# Patient Record
Sex: MALE | Race: WHITE | NOT HISPANIC OR LATINO | URBAN - METROPOLITAN AREA
[De-identification: names, ages, dates, MRNs, and addresses within clinical notes are randomized per-mention and may not be internally consistent; named-entity substitution may affect disease eponyms.]

---

## 2019-11-14 ENCOUNTER — INPATIENT (INPATIENT)
Facility: HOSPITAL | Age: 49
LOS: 1 days | Discharge: HOME | End: 2019-11-16
Attending: NEUROLOGICAL SURGERY | Admitting: NEUROLOGICAL SURGERY
Payer: COMMERCIAL

## 2019-11-14 VITALS
DIASTOLIC BLOOD PRESSURE: 81 MMHG | SYSTOLIC BLOOD PRESSURE: 142 MMHG | HEART RATE: 87 BPM | TEMPERATURE: 97 F | RESPIRATION RATE: 18 BRPM | OXYGEN SATURATION: 100 %

## 2019-11-14 DIAGNOSIS — M21.371 FOOT DROP, RIGHT FOOT: ICD-10-CM

## 2019-11-14 LAB
ALBUMIN SERPL ELPH-MCNC: 4.7 G/DL — SIGNIFICANT CHANGE UP (ref 3.5–5.2)
ALP SERPL-CCNC: 70 U/L — SIGNIFICANT CHANGE UP (ref 30–115)
ALT FLD-CCNC: 67 U/L — HIGH (ref 0–41)
ANION GAP SERPL CALC-SCNC: 12 MMOL/L — SIGNIFICANT CHANGE UP (ref 7–14)
APTT BLD: 29 SEC — SIGNIFICANT CHANGE UP (ref 27–39.2)
AST SERPL-CCNC: 27 U/L — SIGNIFICANT CHANGE UP (ref 0–41)
BASOPHILS # BLD AUTO: 0.03 K/UL — SIGNIFICANT CHANGE UP (ref 0–0.2)
BASOPHILS NFR BLD AUTO: 0.4 % — SIGNIFICANT CHANGE UP (ref 0–1)
BILIRUB SERPL-MCNC: 0.3 MG/DL — SIGNIFICANT CHANGE UP (ref 0.2–1.2)
BLD GP AB SCN SERPL QL: SIGNIFICANT CHANGE UP
BUN SERPL-MCNC: 23 MG/DL — HIGH (ref 10–20)
CALCIUM SERPL-MCNC: 9.4 MG/DL — SIGNIFICANT CHANGE UP (ref 8.5–10.1)
CHLORIDE SERPL-SCNC: 102 MMOL/L — SIGNIFICANT CHANGE UP (ref 98–110)
CO2 SERPL-SCNC: 28 MMOL/L — SIGNIFICANT CHANGE UP (ref 17–32)
CREAT SERPL-MCNC: 0.9 MG/DL — SIGNIFICANT CHANGE UP (ref 0.7–1.5)
EOSINOPHIL # BLD AUTO: 0.12 K/UL — SIGNIFICANT CHANGE UP (ref 0–0.7)
EOSINOPHIL NFR BLD AUTO: 1.6 % — SIGNIFICANT CHANGE UP (ref 0–8)
GLUCOSE SERPL-MCNC: 83 MG/DL — SIGNIFICANT CHANGE UP (ref 70–99)
HCT VFR BLD CALC: 40.3 % — LOW (ref 42–52)
HGB BLD-MCNC: 13.3 G/DL — LOW (ref 14–18)
IMM GRANULOCYTES NFR BLD AUTO: 0.5 % — HIGH (ref 0.1–0.3)
INR BLD: 0.93 RATIO — SIGNIFICANT CHANGE UP (ref 0.65–1.3)
LYMPHOCYTES # BLD AUTO: 1.54 K/UL — SIGNIFICANT CHANGE UP (ref 1.2–3.4)
LYMPHOCYTES # BLD AUTO: 20.3 % — LOW (ref 20.5–51.1)
MCHC RBC-ENTMCNC: 30.1 PG — SIGNIFICANT CHANGE UP (ref 27–31)
MCHC RBC-ENTMCNC: 33 G/DL — SIGNIFICANT CHANGE UP (ref 32–37)
MCV RBC AUTO: 91.2 FL — SIGNIFICANT CHANGE UP (ref 80–94)
MONOCYTES # BLD AUTO: 0.8 K/UL — HIGH (ref 0.1–0.6)
MONOCYTES NFR BLD AUTO: 10.6 % — HIGH (ref 1.7–9.3)
NEUTROPHILS # BLD AUTO: 5.04 K/UL — SIGNIFICANT CHANGE UP (ref 1.4–6.5)
NEUTROPHILS NFR BLD AUTO: 66.6 % — SIGNIFICANT CHANGE UP (ref 42.2–75.2)
NRBC # BLD: 0 /100 WBCS — SIGNIFICANT CHANGE UP (ref 0–0)
PLATELET # BLD AUTO: 277 K/UL — SIGNIFICANT CHANGE UP (ref 130–400)
POTASSIUM SERPL-MCNC: 4.2 MMOL/L — SIGNIFICANT CHANGE UP (ref 3.5–5)
POTASSIUM SERPL-SCNC: 4.2 MMOL/L — SIGNIFICANT CHANGE UP (ref 3.5–5)
PROT SERPL-MCNC: 7.1 G/DL — SIGNIFICANT CHANGE UP (ref 6–8)
PROTHROM AB SERPL-ACNC: 10.7 SEC — SIGNIFICANT CHANGE UP (ref 9.95–12.87)
RBC # BLD: 4.42 M/UL — LOW (ref 4.7–6.1)
RBC # FLD: 12.2 % — SIGNIFICANT CHANGE UP (ref 11.5–14.5)
SODIUM SERPL-SCNC: 142 MMOL/L — SIGNIFICANT CHANGE UP (ref 135–146)
WBC # BLD: 7.57 K/UL — SIGNIFICANT CHANGE UP (ref 4.8–10.8)
WBC # FLD AUTO: 7.57 K/UL — SIGNIFICANT CHANGE UP (ref 4.8–10.8)

## 2019-11-14 PROCEDURE — 71045 X-RAY EXAM CHEST 1 VIEW: CPT | Mod: 26

## 2019-11-14 PROCEDURE — 93010 ELECTROCARDIOGRAM REPORT: CPT

## 2019-11-14 PROCEDURE — 99285 EMERGENCY DEPT VISIT HI MDM: CPT

## 2019-11-14 RX ORDER — SENNA PLUS 8.6 MG/1
2 TABLET ORAL AT BEDTIME
Refills: 0 | Status: DISCONTINUED | OUTPATIENT
Start: 2019-11-14 | End: 2019-11-15

## 2019-11-14 RX ORDER — DEXAMETHASONE 0.5 MG/5ML
10 ELIXIR ORAL ONCE
Refills: 0 | Status: DISCONTINUED | OUTPATIENT
Start: 2019-11-14 | End: 2019-11-15

## 2019-11-14 RX ORDER — ACETAMINOPHEN 500 MG
650 TABLET ORAL ONCE
Refills: 0 | Status: COMPLETED | OUTPATIENT
Start: 2019-11-14 | End: 2019-11-14

## 2019-11-14 RX ORDER — MORPHINE SULFATE 50 MG/1
2 CAPSULE, EXTENDED RELEASE ORAL EVERY 4 HOURS
Refills: 0 | Status: DISCONTINUED | OUTPATIENT
Start: 2019-11-14 | End: 2019-11-15

## 2019-11-14 RX ORDER — GABAPENTIN 400 MG/1
600 CAPSULE ORAL AT BEDTIME
Refills: 0 | Status: DISCONTINUED | OUTPATIENT
Start: 2019-11-14 | End: 2019-11-15

## 2019-11-14 RX ORDER — ACETAMINOPHEN 500 MG
650 TABLET ORAL EVERY 6 HOURS
Refills: 0 | Status: DISCONTINUED | OUTPATIENT
Start: 2019-11-14 | End: 2019-11-15

## 2019-11-14 RX ORDER — LOSARTAN POTASSIUM 100 MG/1
25 TABLET, FILM COATED ORAL DAILY
Refills: 0 | Status: DISCONTINUED | OUTPATIENT
Start: 2019-11-14 | End: 2019-11-15

## 2019-11-14 RX ORDER — INFLUENZA VIRUS VACCINE 15; 15; 15; 15 UG/.5ML; UG/.5ML; UG/.5ML; UG/.5ML
0.5 SUSPENSION INTRAMUSCULAR ONCE
Refills: 0 | Status: COMPLETED | OUTPATIENT
Start: 2019-11-14 | End: 2019-11-14

## 2019-11-14 RX ORDER — ONDANSETRON 8 MG/1
4 TABLET, FILM COATED ORAL EVERY 6 HOURS
Refills: 0 | Status: DISCONTINUED | OUTPATIENT
Start: 2019-11-14 | End: 2019-11-15

## 2019-11-14 RX ORDER — OXYCODONE AND ACETAMINOPHEN 5; 325 MG/1; MG/1
2 TABLET ORAL EVERY 4 HOURS
Refills: 0 | Status: DISCONTINUED | OUTPATIENT
Start: 2019-11-14 | End: 2019-11-15

## 2019-11-14 RX ORDER — SODIUM CHLORIDE 9 MG/ML
1000 INJECTION, SOLUTION INTRAVENOUS
Refills: 0 | Status: DISCONTINUED | OUTPATIENT
Start: 2019-11-14 | End: 2019-11-15

## 2019-11-14 RX ORDER — DEXAMETHASONE 0.5 MG/5ML
4 ELIXIR ORAL EVERY 6 HOURS
Refills: 0 | Status: DISCONTINUED | OUTPATIENT
Start: 2019-11-14 | End: 2019-11-15

## 2019-11-14 RX ORDER — OXYCODONE AND ACETAMINOPHEN 5; 325 MG/1; MG/1
1 TABLET ORAL EVERY 4 HOURS
Refills: 0 | Status: DISCONTINUED | OUTPATIENT
Start: 2019-11-14 | End: 2019-11-15

## 2019-11-14 RX ORDER — PANTOPRAZOLE SODIUM 20 MG/1
40 TABLET, DELAYED RELEASE ORAL
Refills: 0 | Status: DISCONTINUED | OUTPATIENT
Start: 2019-11-14 | End: 2019-11-15

## 2019-11-14 RX ADMIN — Medication 4 MILLIGRAM(S): at 23:24

## 2019-11-14 RX ADMIN — GABAPENTIN 600 MILLIGRAM(S): 400 CAPSULE ORAL at 21:49

## 2019-11-14 RX ADMIN — SODIUM CHLORIDE 75 MILLILITER(S): 9 INJECTION, SOLUTION INTRAVENOUS at 23:23

## 2019-11-14 RX ADMIN — Medication 650 MILLIGRAM(S): at 21:48

## 2019-11-14 NOTE — H&P ADULT - HISTORY OF PRESENT ILLNESS
49 year old male with hx of R t foot drop for 2 weeks Pt also c/o pain in his Right LE . Denies any bladder or bowel incontinence .pt was seen in the office by Dr Weinstein and she sent him to the ER

## 2019-11-14 NOTE — H&P ADULT - NSHPLABSRESULTS_GEN_ALL_CORE
Activated Partial Thromboplastin Time in AM (11.14.19 @ 17:22)    Activated Partial Thromboplastin Time: 29.0: Heparin Therapeutic Range: 76..97 Sec sec  Prothrombin Time, Plasma: 10.70 sec (11.14.19 @ 17:22)  INR: 0.93: Recommended ranges for therapeutic INR:    2.0-3.0 for most medical and surgical thromboembolic states    2.0-3.0 for atrial fibrillation    2.0-3.0 for bileaflet mechanical valve in aortic position    2.5-3.5 for mechanical heart valves    Chest 2004;126:[id number]  The presence of direct thrombin inhibitors (argatroban, refludan)  may falsely increase results. ratio (11.14.19 @ 17:22)  Complete Blood Count + Automated Diff (11.14.19 @ 17:22)    WBC Count: 7.57 K/uL    RBC Count: 4.42 M/uL    Hemoglobin: 13.3 g/dL    Hematocrit: 40.3 %    Mean Cell Volume: 91.2 fL    Mean Cell Hemoglobin: 30.1 pg    Mean Cell Hemoglobin Conc: 33.0 g/dL    Red Cell Distrib Width: 12.2 %    Platelet Count - Automated: 277 K/uL    Auto Neutrophil #: 5.04 K/uL    Auto Lymphocyte #: 1.54 K/uL    Auto Monocyte #: 0.80 K/uL    Auto Eosinophil #: 0.12 K/uL    Auto Basophil #: 0.03 K/uL    Auto Neutrophil %: 66.6: Differential percentages must be correlated with absolute numbers for  clinical significance. %    Auto Lymphocyte %: 20.3 %    Auto Monocyte %: 10.6 %    Auto Eosinophil %: 1.6 %    Auto Basophil %: 0.4 %    Auto Immature Granulocyte %: 0.5 %    Nucleated RBC: 0 /100 WBCs  Comprehensive Metabolic Panel (11.14.19 @ 17:22)    Sodium, Serum: 142 mmol/L    Potassium, Serum: 4.2 mmol/L    Chloride, Serum: 102 mmol/L    Carbon Dioxide, Serum: 28 mmol/L    Anion Gap, Serum: 12 mmol/L    Blood Urea Nitrogen, Serum: 23 mg/dL    Creatinine, Serum: 0.9 mg/dL    Glucose, Serum: 83 mg/dL    Calcium, Total Serum: 9.4 mg/dL    Protein Total, Serum: 7.1 g/dL    Albumin, Serum: 4.7 g/dL    Bilirubin Total, Serum: 0.3 mg/dL    Alkaline Phosphatase, Serum: 70 U/L    Aspartate Aminotransferase (AST/SGOT): 27 U/L    Alanine Aminotransferase (ALT/SGPT): 67 U/L    eGFR if Non : 99: Interpretative comment  The units for eGFR are mL/min/1.73M2 (normalized body surface area). The  eGFR is calculated from a serum creatinine using the CKD-EPI equation.  Other variables required for calculation are race, age and sex. Among  patients with chronic kidney disease (CKD), the eGFR is useful in  determining the stage of disease according to KDOQI CKD classification.  All eGFR results are reported numerically with the following  interpretation.          GFR                    With                 Without     (ml/min/1.73 m2)    Kidney Damage       Kidney Damage        >= 90                    Stage 1                     Normal        60-89                    Stage 2                     Decreased GFR        30-59     Stage 3                     Stage 3        15-29                    Stage 4                     Stage 4        < 15                      Stage 5                     Stage 5  Each stage of CKD assumes that the associated GFR level has been in  effect for at least 3 months. Determination of stages one and two (with  eGFR > 59 ml/min/m2) requires estimation of kidney damage for at least 3  months as defined by structural or functional abnormalities.  Limitations: All estimates of GFR will be less accurate for patients at  extremes of muscle mass (including but not limited to frail elderly,  critically ill, or cancer patients), those with unusual diets, and those  with conditions associated with reduced secretion or extrarenal  elimination of creatinine. The eGFR equation is not recommended for use  in patients with unstable creatinine levels. mL/min/1.73M2    eGFR if African American: 115 mL/min/1.73M2

## 2019-11-14 NOTE — ED PROVIDER NOTE - PHYSICAL EXAMINATION
Vital Signs: I have reviewed the initial vital signs.  Constitutional: NAD, well-nourished, appears stated age, no acute distress.  HEENT: Airway patent, moist MM, no erythema/swelling/deformity of oral structures. EOMI, PERRLA.  CV: regular rate, regular rhythm, well-perfused extremities, 2+ b/l DP and radial pulses equal.  Lungs: BCTA, no increased WOB.  ABD: NTND, no guarding or rebound, no pulsatile mass, no hernias.   MSK: Neck supple, nontender, nl ROM, no stepoff. Chest nontender. Back nontender in TLS spine or to b/l bony structures or flanks. +foot drop  INTEG: Skin warm, dry, no rash.  NEURO: A&Ox3, moving all extremities, normal speech  PSYCH: Calm, cooperative, normal affect and interaction.

## 2019-11-14 NOTE — ED PROVIDER NOTE - ATTENDING CONTRIBUTION TO CARE
49yoM with h/o HTN, GERD, presents with R buttock pain radiating down leg and R foot drop x 2 wks, sent in by Dr. Weinsteni in the office today due to MRI resulted today that showed L4-5 disc herniation. Has been on a 5-day steroid course with significant improvement, now reporting only mild achiness and foot drop. Denies urinary or fecal incontinence or retention, fever, abdominal pain, vomiting, urinary symptoms. On exam, afebrile, hemodynamically stable, saturating well, NAD, well appearing, head NCAT, EOMI grossly, anicteric, MMM, no JVD, RRR, nml S1/S2, no m/r/g, lungs CTAB, no w/r/r, abd soft, NT, ND, nml BS, no rebound or guarding, AAO, CN's 3-12 grossly intact, TRUJILLO spontaneously, no leg cyanosis or edema, skin warm, well perfused, no rashes or hives. Difficulty with R foot dorsiflexion, sensation intact/symmetric. No CTL spine TTP/stepoff/deformity. No abdominal s/s's. No signs of acute cord compression. Pt well appearing, pain controlled. Admitted to NSGY per their request, they will f/u the labs/studies sent.

## 2019-11-14 NOTE — ED PROVIDER NOTE - OBJECTIVE STATEMENT
49yM with PMH gerd and htn p/w acute foot drop X 2 wk, referred in by Dr Weinstein for admission to neurosurgery in context of L4-5 disc herniation. pt states he had foot drop issues X 2 weeks with severe pain for past week. he is s/p 5 day course of steroids completed on saturday. no fever chills. +lower back pain and extremity pain. no sensory loss.

## 2019-11-14 NOTE — ED ADULT NURSE NOTE - OBJECTIVE STATEMENT
" I jhad severe pain on right hip down my leg and aches" states has tingling in right leg. " pain comes and goes but tingling is always there. came from pmd office and was told to come here due to right foot drop " dr lowery" denies n/v/f/d. denies sob. denies hematuria denies issue going to bathroom

## 2019-11-14 NOTE — H&P ADULT - NSHPREVIEWOFSYSTEMS_GEN_ALL_CORE
· Review of Systems: Eyes:  No visual changes, eye pain or discharge.  	ENMT:  No hearing changes, pain, no sore throat or runny nose, no difficulty swallowing  	Cardiac:  No chest pain, SOB or edema. No chest pain with exertion.  	Respiratory:  No cough or respiratory distress.    	GI:  No nausea, vomiting, diarrhea or abdominal pain.  	:  No dysuria, frequency or burning.  	MS:  No myalgia, +weakness, no joint pain . +back pain.  	Neuro:  No headache.  No LOC.  	Skin:  No skin rash.   Endocrine: No history of thyroid disease or diabetes.

## 2019-11-14 NOTE — H&P ADULT - NSHPPHYSICALEXAM_GEN_ALL_CORE
General well nourished well developed   Head NC AT   CHest CTA ,   Heart S1S2 nl   Abd + BS  NT/ ND  SOft   Ext no C/C/E   Neuro A&OX3, PERRL              MAEX4              MS LLE 5/5              MS RLE 5/5 proximal 5/5 knee 5/5 PF 3/5 DF

## 2019-11-14 NOTE — ED PROVIDER NOTE - NS ED ROS FT
Eyes:  No visual changes, eye pain or discharge.  ENMT:  No hearing changes, pain, no sore throat or runny nose, no difficulty swallowing  Cardiac:  No chest pain, SOB or edema. No chest pain with exertion.  Respiratory:  No cough or respiratory distress.    GI:  No nausea, vomiting, diarrhea or abdominal pain.  :  No dysuria, frequency or burning.  MS:  No myalgia, +weakness, no joint pain . +back pain.  Neuro:  No headache.  No LOC.  Skin:  No skin rash.   Endocrine: No history of thyroid disease or diabetes.

## 2019-11-14 NOTE — ED ADULT TRIAGE NOTE - CHIEF COMPLAINT QUOTE
sent in by Marina SHOEMAKER (Neuro) for admission, surgery to repair L4-L5 disc herniation, acute foot drop

## 2019-11-14 NOTE — ED PROVIDER NOTE - CLINICAL SUMMARY MEDICAL DECISION MAKING FREE TEXT BOX
49yoM with h/o HTN, GERD, presents with R buttock pain radiating down leg and R foot drop x 2 wks, sent in by Dr. Weinstein in the office today due to MRI resulted today that showed L4-5 disc herniation. Has been on a 5-day steroid course with significant improvement, now reporting only mild achiness and foot drop. Denies urinary or fecal incontinence or retention, fever, abdominal pain, vomiting, urinary symptoms. On exam, afebrile, hemodynamically stable, saturating well, NAD, well appearing, head NCAT, EOMI grossly, anicteric, MMM, no JVD, RRR, nml S1/S2, no m/r/g, lungs CTAB, no w/r/r, abd soft, NT, ND, nml BS, no rebound or guarding, AAO, CN's 3-12 grossly intact, TRUJILLO spontaneously, no leg cyanosis or edema, skin warm, well perfused, no rashes or hives. Difficulty with R foot dorsiflexion, sensation intact/symmetric. No CTL spine TTP/stepoff/deformity. No abdominal s/s's. No signs of acute cord compression. Pt well appearing, pain controlled. Admitted to NSGY per their request, they will f/u the labs/studies sent.

## 2019-11-15 ENCOUNTER — RESULT REVIEW (OUTPATIENT)
Age: 49
End: 2019-11-15

## 2019-11-15 PROCEDURE — 88311 DECALCIFY TISSUE: CPT | Mod: 26

## 2019-11-15 PROCEDURE — 88304 TISSUE EXAM BY PATHOLOGIST: CPT | Mod: 26

## 2019-11-15 RX ORDER — MORPHINE SULFATE 50 MG/1
2 CAPSULE, EXTENDED RELEASE ORAL EVERY 4 HOURS
Refills: 0 | Status: DISCONTINUED | OUTPATIENT
Start: 2019-11-15 | End: 2019-11-16

## 2019-11-15 RX ORDER — MEPERIDINE HYDROCHLORIDE 50 MG/ML
12.5 INJECTION INTRAMUSCULAR; INTRAVENOUS; SUBCUTANEOUS
Refills: 0 | Status: DISCONTINUED | OUTPATIENT
Start: 2019-11-15 | End: 2019-11-16

## 2019-11-15 RX ORDER — ACETAMINOPHEN 500 MG
650 TABLET ORAL EVERY 6 HOURS
Refills: 0 | Status: DISCONTINUED | OUTPATIENT
Start: 2019-11-15 | End: 2019-11-16

## 2019-11-15 RX ORDER — HEPARIN SODIUM 5000 [USP'U]/ML
5000 INJECTION INTRAVENOUS; SUBCUTANEOUS EVERY 8 HOURS
Refills: 0 | Status: DISCONTINUED | OUTPATIENT
Start: 2019-11-16 | End: 2019-11-16

## 2019-11-15 RX ORDER — CEFAZOLIN SODIUM 1 G
1000 VIAL (EA) INJECTION EVERY 8 HOURS
Refills: 0 | Status: COMPLETED | OUTPATIENT
Start: 2019-11-15 | End: 2019-11-16

## 2019-11-15 RX ORDER — PANTOPRAZOLE SODIUM 20 MG/1
40 TABLET, DELAYED RELEASE ORAL
Refills: 0 | Status: DISCONTINUED | OUTPATIENT
Start: 2019-11-15 | End: 2019-11-16

## 2019-11-15 RX ORDER — ONDANSETRON 8 MG/1
4 TABLET, FILM COATED ORAL ONCE
Refills: 0 | Status: DISCONTINUED | OUTPATIENT
Start: 2019-11-15 | End: 2019-11-16

## 2019-11-15 RX ORDER — DEXAMETHASONE 0.5 MG/5ML
2 ELIXIR ORAL EVERY 8 HOURS
Refills: 0 | Status: COMPLETED | OUTPATIENT
Start: 2019-11-15 | End: 2019-11-16

## 2019-11-15 RX ORDER — SODIUM CHLORIDE 9 MG/ML
1000 INJECTION, SOLUTION INTRAVENOUS
Refills: 0 | Status: DISCONTINUED | OUTPATIENT
Start: 2019-11-15 | End: 2019-11-16

## 2019-11-15 RX ORDER — ONDANSETRON 8 MG/1
4 TABLET, FILM COATED ORAL EVERY 6 HOURS
Refills: 0 | Status: DISCONTINUED | OUTPATIENT
Start: 2019-11-15 | End: 2019-11-16

## 2019-11-15 RX ORDER — OXYCODONE AND ACETAMINOPHEN 5; 325 MG/1; MG/1
1 TABLET ORAL EVERY 4 HOURS
Refills: 0 | Status: DISCONTINUED | OUTPATIENT
Start: 2019-11-15 | End: 2019-11-16

## 2019-11-15 RX ORDER — HYDROMORPHONE HYDROCHLORIDE 2 MG/ML
0.5 INJECTION INTRAMUSCULAR; INTRAVENOUS; SUBCUTANEOUS
Refills: 0 | Status: DISCONTINUED | OUTPATIENT
Start: 2019-11-15 | End: 2019-11-16

## 2019-11-15 RX ORDER — OXYCODONE AND ACETAMINOPHEN 5; 325 MG/1; MG/1
2 TABLET ORAL EVERY 4 HOURS
Refills: 0 | Status: DISCONTINUED | OUTPATIENT
Start: 2019-11-15 | End: 2019-11-16

## 2019-11-15 RX ORDER — GABAPENTIN 400 MG/1
600 CAPSULE ORAL AT BEDTIME
Refills: 0 | Status: DISCONTINUED | OUTPATIENT
Start: 2019-11-15 | End: 2019-11-16

## 2019-11-15 RX ORDER — HYDROMORPHONE HYDROCHLORIDE 2 MG/ML
1 INJECTION INTRAMUSCULAR; INTRAVENOUS; SUBCUTANEOUS
Refills: 0 | Status: DISCONTINUED | OUTPATIENT
Start: 2019-11-15 | End: 2019-11-16

## 2019-11-15 RX ORDER — SENNA PLUS 8.6 MG/1
2 TABLET ORAL AT BEDTIME
Refills: 0 | Status: DISCONTINUED | OUTPATIENT
Start: 2019-11-15 | End: 2019-11-16

## 2019-11-15 RX ORDER — LOSARTAN POTASSIUM 100 MG/1
25 TABLET, FILM COATED ORAL DAILY
Refills: 0 | Status: DISCONTINUED | OUTPATIENT
Start: 2019-11-15 | End: 2019-11-16

## 2019-11-15 RX ADMIN — HYDROMORPHONE HYDROCHLORIDE 0.5 MILLIGRAM(S): 2 INJECTION INTRAMUSCULAR; INTRAVENOUS; SUBCUTANEOUS at 17:43

## 2019-11-15 RX ADMIN — MORPHINE SULFATE 2 MILLIGRAM(S): 50 CAPSULE, EXTENDED RELEASE ORAL at 23:34

## 2019-11-15 RX ADMIN — Medication 100 MILLIGRAM(S): at 23:32

## 2019-11-15 RX ADMIN — HYDROMORPHONE HYDROCHLORIDE 0.5 MILLIGRAM(S): 2 INJECTION INTRAMUSCULAR; INTRAVENOUS; SUBCUTANEOUS at 18:03

## 2019-11-15 RX ADMIN — LOSARTAN POTASSIUM 25 MILLIGRAM(S): 100 TABLET, FILM COATED ORAL at 05:55

## 2019-11-15 RX ADMIN — GABAPENTIN 600 MILLIGRAM(S): 400 CAPSULE ORAL at 22:12

## 2019-11-15 RX ADMIN — SODIUM CHLORIDE 75 MILLILITER(S): 9 INJECTION, SOLUTION INTRAVENOUS at 18:31

## 2019-11-15 RX ADMIN — HYDROMORPHONE HYDROCHLORIDE 0.5 MILLIGRAM(S): 2 INJECTION INTRAMUSCULAR; INTRAVENOUS; SUBCUTANEOUS at 18:17

## 2019-11-15 RX ADMIN — Medication 2 MILLIGRAM(S): at 22:12

## 2019-11-15 RX ADMIN — HYDROMORPHONE HYDROCHLORIDE 0.5 MILLIGRAM(S): 2 INJECTION INTRAMUSCULAR; INTRAVENOUS; SUBCUTANEOUS at 18:02

## 2019-11-15 RX ADMIN — SODIUM CHLORIDE 100 MILLILITER(S): 9 INJECTION, SOLUTION INTRAVENOUS at 17:32

## 2019-11-15 RX ADMIN — Medication 4 MILLIGRAM(S): at 05:55

## 2019-11-15 NOTE — PROGRESS NOTE ADULT - SUBJECTIVE AND OBJECTIVE BOX
EFRAÍN BRADFORD  MRN-[id number]    L4-5 microdiscectomy     Current Issues:    doing well.  some back pain.  no radicular pain.  states his right foot  has improved on his movements.  no sensory changes     HPI:  49 year old male with hx of R t foot drop for 2 weeks Pt also c/o pain in his Right LE . Denies any bladder or bowel incontinence .pt was seen in the office by Dr Weinstein and she sent him to the ER (14 Nov 2019 18:38)      Allergies    No Known Allergies    Intolerances      MEDICATIONS  (STANDING):  ceFAZolin   IVPB 1000 milliGRAM(s) IV Intermittent every 8 hours  dextrose 5% + sodium chloride 0.45%. 1000 milliLiter(s) (75 mL/Hr) IV Continuous <Continuous>  gabapentin 600 milliGRAM(s) Oral at bedtime  influenza   Vaccine 0.5 milliLiter(s) IntraMuscular once  lactated ringers. 1000 milliLiter(s) (100 mL/Hr) IV Continuous <Continuous>  losartan 25 milliGRAM(s) Oral daily  multivitamin 1 Tablet(s) Oral daily  pantoprazole    Tablet 40 milliGRAM(s) Oral before breakfast    MEDICATIONS  (PRN):  acetaminophen   Tablet .. 650 milliGRAM(s) Oral every 6 hours PRN Temp greater or equal to 38C (100.4F)  HYDROmorphone  Injectable 0.5 milliGRAM(s) IV Push every 10 minutes PRN Moderate Pain (4 - 6)  HYDROmorphone  Injectable 1 milliGRAM(s) IV Push every 10 minutes PRN Severe Pain (7 - 10)  meperidine     Injectable 12.5 milliGRAM(s) IV Push every 10 minutes PRN Shivering  morphine  - Injectable 2 milliGRAM(s) IV Push every 4 hours PRN Severe Pain (7 - 10)  ondansetron Injectable 4 milliGRAM(s) IV Push every 6 hours PRN Nausea and/or Vomiting  ondansetron Injectable 4 milliGRAM(s) IV Push once PRN Nausea and/or Vomiting  oxycodone    5 mG/acetaminophen 325 mG 2 Tablet(s) Oral every 4 hours PRN Moderate Pain (4 - 6)  oxycodone    5 mG/acetaminophen 325 mG 1 Tablet(s) Oral every 4 hours PRN Mild Pain (1 - 3)  senna 2 Tablet(s) Oral at bedtime PRN Constipation    Vital Signs Last 24 Hrs  T(C): 36.5 (15 Nov 2019 05:43), Max: 36.6 (14 Nov 2019 19:17)  T(F): 97.7 (15 Nov 2019 05:43), Max: 97.9 (14 Nov 2019 19:17)  HR: 82 (15 Nov 2019 05:43) (82 - 100)  BP: 124/71 (15 Nov 2019 05:43) (124/71 - 142/77)  BP(mean): --  RR: 18 (15 Nov 2019 05:43) (18 - 18)  SpO2: --    I&O's Detail    11-14    142  |  102  |  23<H>  ----------------------------<  83  4.2   |  28  |  0.9    Ca    9.4      14 Nov 2019 17:22    TPro  7.1  /  Alb  4.7  /  TBili  0.3  /  DBili  x   /  AST  27  /  ALT  67<H>  /  AlkPhos  70  11-14                          13.3   7.57  )-----------( 277      ( 14 Nov 2019 17:22 )             40.3     Exam:    back dry  LE  hip flex/ leg ext/ DF PF EHL on left 5/5       hip flex/ leg ext/ DF PF EHL on left 5/5  sensory intact bilat    Plan:  decadron 2q8 x 24 hrs  pain management  OOB PT rehab  dc tomorrow when cleared oob , ? AFO if needed to right foot after PT eval.                               Home Medications:  Allegra 30 mg oral tablet:  (14 Nov 2019 18:43)  Benicar 20 mg oral tablet: 1 tab(s) orally once a day (14 Nov 2019 18:42)  NexIUM 24HR 20 mg oral delayed release tablet: 1 tab(s) orally once a day (in the morning) (14 Nov 2019 18:43)     PAST MEDICAL & SURGICAL HISTORY:  Acid reflux  HTN (hypertension)

## 2019-11-15 NOTE — BRIEF OPERATIVE NOTE - NSICDXBRIEFPOSTOP_GEN_ALL_CORE_FT
POST-OP DIAGNOSIS:  Lumbar disc herniation with radiculopathy 15-Nov-2019 17:48:37  Gabrielle Weinstein

## 2019-11-15 NOTE — BRIEF OPERATIVE NOTE - OPERATION/FINDINGS
right sided lateral disc herniation posterior to vertebral body of L4.  Sequestered fragment was removed.

## 2019-11-16 ENCOUNTER — TRANSCRIPTION ENCOUNTER (OUTPATIENT)
Age: 49
End: 2019-11-16

## 2019-11-16 VITALS
SYSTOLIC BLOOD PRESSURE: 139 MMHG | HEART RATE: 102 BPM | TEMPERATURE: 98 F | DIASTOLIC BLOOD PRESSURE: 75 MMHG | RESPIRATION RATE: 18 BRPM

## 2019-11-16 RX ORDER — GABAPENTIN 400 MG/1
1 CAPSULE ORAL
Qty: 0 | Refills: 0 | DISCHARGE
Start: 2019-11-16

## 2019-11-16 RX ORDER — METHOCARBAMOL 500 MG/1
1 TABLET, FILM COATED ORAL
Qty: 30 | Refills: 0
Start: 2019-11-16

## 2019-11-16 RX ORDER — SENNA PLUS 8.6 MG/1
2 TABLET ORAL
Qty: 0 | Refills: 0 | DISCHARGE
Start: 2019-11-16

## 2019-11-16 RX ADMIN — HEPARIN SODIUM 5000 UNIT(S): 5000 INJECTION INTRAVENOUS; SUBCUTANEOUS at 13:39

## 2019-11-16 RX ADMIN — Medication 2 MILLIGRAM(S): at 13:38

## 2019-11-16 RX ADMIN — LOSARTAN POTASSIUM 25 MILLIGRAM(S): 100 TABLET, FILM COATED ORAL at 05:38

## 2019-11-16 RX ADMIN — Medication 100 MILLIGRAM(S): at 13:43

## 2019-11-16 RX ADMIN — Medication 1 TABLET(S): at 11:29

## 2019-11-16 RX ADMIN — PANTOPRAZOLE SODIUM 40 MILLIGRAM(S): 20 TABLET, DELAYED RELEASE ORAL at 06:01

## 2019-11-16 RX ADMIN — Medication 100 MILLIGRAM(S): at 05:37

## 2019-11-16 RX ADMIN — SODIUM CHLORIDE 75 MILLILITER(S): 9 INJECTION, SOLUTION INTRAVENOUS at 05:37

## 2019-11-16 RX ADMIN — Medication 2 MILLIGRAM(S): at 05:38

## 2019-11-16 RX ADMIN — ONDANSETRON 4 MILLIGRAM(S): 8 TABLET, FILM COATED ORAL at 00:19

## 2019-11-16 RX ADMIN — MORPHINE SULFATE 2 MILLIGRAM(S): 50 CAPSULE, EXTENDED RELEASE ORAL at 00:35

## 2019-11-16 RX ADMIN — OXYCODONE AND ACETAMINOPHEN 1 TABLET(S): 5; 325 TABLET ORAL at 08:34

## 2019-11-16 RX ADMIN — HEPARIN SODIUM 5000 UNIT(S): 5000 INJECTION INTRAVENOUS; SUBCUTANEOUS at 05:38

## 2019-11-16 NOTE — DISCHARGE NOTE PROVIDER - NSDCCPCAREPLAN_GEN_ALL_CORE_FT
PRINCIPAL DISCHARGE DIAGNOSIS  Diagnosis: Herniated lumbar intervertebral disc  Assessment and Plan of Treatment:       SECONDARY DISCHARGE DIAGNOSES  Diagnosis: Right foot drop  Assessment and Plan of Treatment: Right foot drop    Diagnosis: Herniated lumbar intervertebral disc  Assessment and Plan of Treatment:

## 2019-11-16 NOTE — DISCHARGE NOTE NURSING/CASE MANAGEMENT/SOCIAL WORK - PATIENT PORTAL LINK FT
You can access the FollowMyHealth Patient Portal offered by [facility] by registering at the following website: [email or web address]. By joining [employer]’s FollowMyHealth portal, you will also be able to view your health information using other applications (apps) compatible with our system.

## 2019-11-16 NOTE — DISCHARGE NOTE PROVIDER - CARE PROVIDER_API CALL
Gabrielle Weinstein (MD)  Surgery  [facility] [place] [address]  Phone: [phone]  Fax: [phone]  Follow Up Time:

## 2019-11-16 NOTE — DISCHARGE NOTE PROVIDER - HOSPITAL COURSE
49 year old male right foot drop x 2 weeks and right lower extremity pain/numbness. Sent to ER for eval on 11.14.19. Pt with right L4-5 Disk herniation. Pt taken to the Operating room on 11.15.19 for a Right L4-5 MicroLumbar Diskectomy. Pt did well postop with improvement in his pre-op right leg symptoms and his foot drop. He ambulated with physical therapy and did well. He was discharged home on 11.16.19

## 2019-11-16 NOTE — PROGRESS NOTE ADULT - SUBJECTIVE AND OBJECTIVE BOX
POD # 1    S/P Right L4-5 MLD    Pt seen and examined at bedside. Pt c/o incisional pain at this time. Sts significant improvement in his pre op right lower extremity symptoms including his right foot weakness. Pt has been up ambulating independently and with PT.    Vital Signs Last 24 Hrs  T(C): 36.1 (16 Nov 2019 04:56), Max: 36.9 (15 Nov 2019 17:32)  T(F): 96.9 (16 Nov 2019 04:56), Max: 98.4 (15 Nov 2019 17:32)  HR: 89 (16 Nov 2019 04:56) (89 - 114)  BP: 138/72 (16 Nov 2019 04:56) (131/73 - 166/74)  BP(mean): --  RR: 18 (16 Nov 2019 04:56) (11 - 19)  SpO2: 94% (15 Nov 2019 22:06) (92% - 97%)    PHYSICAL EXAM:  Strength LLE 5/5  RLE DF 4/5 PF 5/5 prox 5/5  Sensation decreased right foot compared to left, equal proximally  KJ R1+, L 2+  Dressing slight bloody d/c    MEDICATIONS:  Antibiotics:  ceFAZolin   IVPB 1000 milliGRAM(s) IV Intermittent every 8 hours    Neuro:  acetaminophen   Tablet .. 650 milliGRAM(s) Oral every 6 hours PRN  gabapentin 600 milliGRAM(s) Oral at bedtime  HYDROmorphone  Injectable 0.5 milliGRAM(s) IV Push every 10 minutes PRN  HYDROmorphone  Injectable 1 milliGRAM(s) IV Push every 10 minutes PRN  meperidine     Injectable 12.5 milliGRAM(s) IV Push every 10 minutes PRN  morphine  - Injectable 2 milliGRAM(s) IV Push every 4 hours PRN  ondansetron Injectable 4 milliGRAM(s) IV Push every 6 hours PRN  ondansetron Injectable 4 milliGRAM(s) IV Push once PRN  oxycodone    5 mG/acetaminophen 325 mG 2 Tablet(s) Oral every 4 hours PRN  oxycodone    5 mG/acetaminophen 325 mG 1 Tablet(s) Oral every 4 hours PRN    Anticoagulation:  heparin  Injectable 5000 Unit(s) SubCutaneous every 8 hours    OTHER:  dexAMETHasone  Injectable 2 milliGRAM(s) IV Push every 8 hours  influenza   Vaccine 0.5 milliLiter(s) IntraMuscular once  losartan 25 milliGRAM(s) Oral daily  pantoprazole    Tablet 40 milliGRAM(s) Oral before breakfast  senna 2 Tablet(s) Oral at bedtime PRN    IVF:  dextrose 5% + sodium chloride 0.45%. 1000 milliLiter(s) IV Continuous <Continuous>  lactated ringers. 1000 milliLiter(s) IV Continuous <Continuous>  multivitamin 1 Tablet(s) Oral daily    LABS:                        13.3   7.57  )-----------( 277      ( 14 Nov 2019 17:22 )             40.3     11-14    142  |  102  |  23<H>  ----------------------------<  83  4.2   |  28  |  0.9    Ca    9.4      14 Nov 2019 17:22    TPro  7.1  /  Alb  4.7  /  TBili  0.3  /  DBili  x   /  AST  27  /  ALT  67<H>  /  AlkPhos  70  11-14    PT/INR - ( 14 Nov 2019 17:22 )   PT: 10.70 sec;   INR: 0.93 ratio    PTT - ( 14 Nov 2019 17:22 )  PTT:29.0 sec    Assessment:  As above    Plan:  Pain Meds PRN  D/C Home

## 2019-11-16 NOTE — DISCHARGE NOTE PROVIDER - NSDCFUADDINST_GEN_ALL_CORE_FT
Keep incision clean. May shower with a dressing today. May remove dressing tomorrow and get incision wet. No baths. Cover for comfort

## 2019-11-16 NOTE — DISCHARGE NOTE PROVIDER - NSDCACTIVITY_GEN_ALL_CORE
No heavy lifting/straining/Stairs allowed/Do not drive or operate machinery/Showering allowed/Do not make important decisions

## 2019-11-16 NOTE — DISCHARGE NOTE PROVIDER - NSDCMRMEDTOKEN_GEN_ALL_CORE_FT
Allegra 30 mg oral tablet:   Benicar 20 mg oral tablet: 1 tab(s) orally once a day  gabapentin 600 mg oral tablet: 1 tab(s) orally once a day (at bedtime)  NexIUM 24HR 20 mg oral delayed release tablet: 1 tab(s) orally once a day (in the morning)  oxycodone-acetaminophen 5 mg-325 mg oral tablet: 1-2 tab(s) orally every 4-6 hours, As needed, Mild to Moderate Pain (1 - 3) MDD:6  Robaxin-750 oral tablet: 1 tab(s) orally every 6 hours, As Needed -for muscle spasm   senna oral tablet: 2 tab(s) orally once a day (at bedtime), As needed, Constipation

## 2019-11-20 ENCOUNTER — EMERGENCY (EMERGENCY)
Facility: HOSPITAL | Age: 49
LOS: 0 days | Discharge: HOME | End: 2019-11-21
Attending: EMERGENCY MEDICINE | Admitting: EMERGENCY MEDICINE
Payer: COMMERCIAL

## 2019-11-20 VITALS
RESPIRATION RATE: 18 BRPM | HEART RATE: 104 BPM | OXYGEN SATURATION: 97 % | WEIGHT: 195.11 LBS | DIASTOLIC BLOOD PRESSURE: 89 MMHG | SYSTOLIC BLOOD PRESSURE: 145 MMHG | TEMPERATURE: 99 F

## 2019-11-20 DIAGNOSIS — I10 ESSENTIAL (PRIMARY) HYPERTENSION: ICD-10-CM

## 2019-11-20 DIAGNOSIS — M21.371 FOOT DROP, RIGHT FOOT: ICD-10-CM

## 2019-11-20 DIAGNOSIS — I82.401 ACUTE EMBOLISM AND THROMBOSIS OF UNSPECIFIED DEEP VEINS OF RIGHT LOWER EXTREMITY: ICD-10-CM

## 2019-11-20 DIAGNOSIS — K21.9 GASTRO-ESOPHAGEAL REFLUX DISEASE WITHOUT ESOPHAGITIS: ICD-10-CM

## 2019-11-20 DIAGNOSIS — M51.16 INTERVERTEBRAL DISC DISORDERS WITH RADICULOPATHY, LUMBAR REGION: ICD-10-CM

## 2019-11-20 LAB
ALBUMIN SERPL ELPH-MCNC: 4.5 G/DL — SIGNIFICANT CHANGE UP (ref 3.5–5.2)
ALP SERPL-CCNC: 122 U/L — HIGH (ref 30–115)
ALT FLD-CCNC: 134 U/L — HIGH (ref 0–41)
ANION GAP SERPL CALC-SCNC: 16 MMOL/L — HIGH (ref 7–14)
APTT BLD: 27.5 SEC — SIGNIFICANT CHANGE UP (ref 27–39.2)
AST SERPL-CCNC: 75 U/L — HIGH (ref 0–41)
BASOPHILS # BLD AUTO: 0.03 K/UL — SIGNIFICANT CHANGE UP (ref 0–0.2)
BASOPHILS NFR BLD AUTO: 0.4 % — SIGNIFICANT CHANGE UP (ref 0–1)
BILIRUB SERPL-MCNC: 0.2 MG/DL — SIGNIFICANT CHANGE UP (ref 0.2–1.2)
BUN SERPL-MCNC: 19 MG/DL — SIGNIFICANT CHANGE UP (ref 10–20)
CALCIUM SERPL-MCNC: 9.3 MG/DL — SIGNIFICANT CHANGE UP (ref 8.5–10.1)
CHLORIDE SERPL-SCNC: 98 MMOL/L — SIGNIFICANT CHANGE UP (ref 98–110)
CO2 SERPL-SCNC: 26 MMOL/L — SIGNIFICANT CHANGE UP (ref 17–32)
CREAT SERPL-MCNC: 1.3 MG/DL — SIGNIFICANT CHANGE UP (ref 0.7–1.5)
EOSINOPHIL # BLD AUTO: 0.2 K/UL — SIGNIFICANT CHANGE UP (ref 0–0.7)
EOSINOPHIL NFR BLD AUTO: 2.4 % — SIGNIFICANT CHANGE UP (ref 0–8)
GLUCOSE SERPL-MCNC: 107 MG/DL — HIGH (ref 70–99)
HCT VFR BLD CALC: 38.1 % — LOW (ref 42–52)
HGB BLD-MCNC: 12.7 G/DL — LOW (ref 14–18)
IMM GRANULOCYTES NFR BLD AUTO: 0.9 % — HIGH (ref 0.1–0.3)
INR BLD: 0.94 RATIO — SIGNIFICANT CHANGE UP (ref 0.65–1.3)
LYMPHOCYTES # BLD AUTO: 1.67 K/UL — SIGNIFICANT CHANGE UP (ref 1.2–3.4)
LYMPHOCYTES # BLD AUTO: 19.8 % — LOW (ref 20.5–51.1)
MCHC RBC-ENTMCNC: 30 PG — SIGNIFICANT CHANGE UP (ref 27–31)
MCHC RBC-ENTMCNC: 33.3 G/DL — SIGNIFICANT CHANGE UP (ref 32–37)
MCV RBC AUTO: 89.9 FL — SIGNIFICANT CHANGE UP (ref 80–94)
MONOCYTES # BLD AUTO: 0.65 K/UL — HIGH (ref 0.1–0.6)
MONOCYTES NFR BLD AUTO: 7.7 % — SIGNIFICANT CHANGE UP (ref 1.7–9.3)
NEUTROPHILS # BLD AUTO: 5.81 K/UL — SIGNIFICANT CHANGE UP (ref 1.4–6.5)
NEUTROPHILS NFR BLD AUTO: 68.8 % — SIGNIFICANT CHANGE UP (ref 42.2–75.2)
NRBC # BLD: 0 /100 WBCS — SIGNIFICANT CHANGE UP (ref 0–0)
PLATELET # BLD AUTO: 305 K/UL — SIGNIFICANT CHANGE UP (ref 130–400)
POTASSIUM SERPL-MCNC: 4.8 MMOL/L — SIGNIFICANT CHANGE UP (ref 3.5–5)
POTASSIUM SERPL-SCNC: 4.8 MMOL/L — SIGNIFICANT CHANGE UP (ref 3.5–5)
PROT SERPL-MCNC: 7 G/DL — SIGNIFICANT CHANGE UP (ref 6–8)
PROTHROM AB SERPL-ACNC: 10.8 SEC — SIGNIFICANT CHANGE UP (ref 9.95–12.87)
RBC # BLD: 4.24 M/UL — LOW (ref 4.7–6.1)
RBC # FLD: 12 % — SIGNIFICANT CHANGE UP (ref 11.5–14.5)
SODIUM SERPL-SCNC: 140 MMOL/L — SIGNIFICANT CHANGE UP (ref 135–146)
WBC # BLD: 8.44 K/UL — SIGNIFICANT CHANGE UP (ref 4.8–10.8)
WBC # FLD AUTO: 8.44 K/UL — SIGNIFICANT CHANGE UP (ref 4.8–10.8)

## 2019-11-20 PROCEDURE — 99284 EMERGENCY DEPT VISIT MOD MDM: CPT

## 2019-11-20 NOTE — ED PROVIDER NOTE - PATIENT PORTAL LINK FT
You can access the FollowMyHealth Patient Portal offered by [facility] by registering at the following website: [email or web address]. By joining [employer]’s FollowMyHealth portal, you will also be able to view your health information using other applications (apps) compatible with our system.

## 2019-11-20 NOTE — ED PROVIDER NOTE - OBJECTIVE STATEMENT
50 yo male, psh of laminectomy of L3-4 by dr fuentes several days ago, presents to ed for DVT. Pt with right leg swelling, had outpt doppler showed right dvt behind right knee. Mild, aching, no radiation. Denies fever, chills, cp, sob, back pain, neck pain, numbness, tingling, rash.

## 2019-11-20 NOTE — ED PROVIDER NOTE - CLINICAL SUMMARY MEDICAL DECISION MAKING FREE TEXT BOX
pt dx with right pop vein dvt today, s/p lower spinal sx 4 days ago. mild pain to right foot and swelling. no fever, cp, sob. pt in nad, mild pedal edema on right. leg nt. nvi. nml cap refill.   per Dr Weinstein neurosurg is ok with ac if needed. will dw vascular. baesline labs/coags sent.

## 2019-11-20 NOTE — ED PROVIDER NOTE - PHYSICAL EXAMINATION
Physical Exam    Vital Signs: I have reviewed the initial vital signs.  Constitutional: well-nourished, appears stated age, no acute distress  Eyes: Conjunctiva pink, Sclera clear.  Cardiovascular: S1 and S2, regular rate, regular rhythm, well-perfused extremities, radial pulses equal and 2+, pedal pulses 2+ and equal  Respiratory: unlabored respiratory effort, clear to auscultation bilaterally no wheezing, rales and rhonchi  Gastrointestinal: soft, non-tender abdomen, no pulsatile mass, normal bowl sounds  Musculoskeletal: supple neck, mild right lower leg edema, no midline tenderness, right knee from  Integumentary: warm, dry, no rash  Neurologic: awake, alert, nvi, extremities’ motor and sensory functions grossly intact

## 2019-11-20 NOTE — ED PROVIDER NOTE - PROGRESS NOTE DETAILS
vascular aware of pt and will see in ed. as per alfredo cleared for ac wants vascular input as per vascular 10mg eliquis bid x 7 days, then 5mg bid until fu with vascular dr. villalobos. pt aware of plan and understands instructions and return precautions.

## 2019-11-20 NOTE — ED PROVIDER NOTE - NS ED ROS FT
Constitutional: (-) fever, (-) chills  Eyes: (-) visual changes  ENT: (-) nasal congestions  Cardiovascular: (-) chest pain, (-) syncope  Respiratory: (-) cough, (-) shortness of breath, (-) dyspnea,   Gastrointestinal: (-) vomiting, (-) diarrhea, (-)nausea,  Musculoskeletal: (-) neck pain, (-) back pain, (-) joint pain,  Integumentary: (-) rash, (-) edema, (-) bruises  Neurological: (-) headache, (-) loc, (-) dizziness, (-) tingling, (-)numbness,  Peripheral Vascular: (+) leg swelling  :  (-)dysuria,  (-) hematuria  Allergic/Immunologic: (-) pruritus

## 2019-11-20 NOTE — ED PROVIDER NOTE - CARE PROVIDERS DIRECT ADDRESSES
,DirectAddress_Unknown ,DirectAddress_Unknown,[email or web address] the [email or web address]

## 2019-11-20 NOTE — ED ADULT NURSE NOTE - OBJECTIVE STATEMENT
pt is a 48 yo male sent in my PMD for "blood clot behind his right knee", noted to have swelling to right leg. + pedal pulses, recently had spinal surgery, full ROM. sts has slight tingling to right toes, but not new from before sx. denies SOB, recent travel, chest pain, nausea, vomiting, diarrhea, diaphoresis, headache, dizziness, loc, cough, fever, trauma, numbness, tingling, urinary symptoms.

## 2019-11-20 NOTE — ED ADULT TRIAGE NOTE - CHIEF COMPLAINT QUOTE
sent in my PMD for "blood clot behind his right knee"  no redness, swelling noted to area   recently had spinal surgery

## 2019-11-20 NOTE — ED PROVIDER NOTE - PROVIDER TOKENS
PROVIDER:[TOKEN:[[id number]],FOLLOWUP:[1-3 Days]] PROVIDER:[TOKEN:[[id number]],FOLLOWUP:[1-3 Days]],PROVIDER:[TOKEN:[[id number]],FOLLOWUP:[1-3 Days]]

## 2019-11-20 NOTE — ED PROVIDER NOTE - CARE PROVIDER_API CALL
Slim Ariza (MD)  Vascular Surgery  [address] [facility] [facility], [address]  [place] [address]  Phone: [phone]  Follow Up Time: 1-3 Days Slim Ariza)  Vascular Surgery  [address] [facility], [address]  [place] [address]  Phone: [phone]  Follow Up Time: 1-3 Days    Selvin Saldaña)  Surgery; Vascular Surgery  [address] [place] [address], Kevin [address] [place] [id number]  Phone: [phone]  Fax: [phone]  Follow Up Time: 1-3 Days

## 2019-11-21 PROBLEM — I10 ESSENTIAL (PRIMARY) HYPERTENSION: Chronic | Status: ACTIVE | Noted: 2019-11-14

## 2019-11-21 PROBLEM — K21.9 GASTRO-ESOPHAGEAL REFLUX DISEASE WITHOUT ESOPHAGITIS: Chronic | Status: ACTIVE | Noted: 2019-11-14

## 2019-11-21 LAB — SURGICAL PATHOLOGY STUDY: SIGNIFICANT CHANGE UP

## 2019-11-21 RX ORDER — APIXABAN 2.5 MG/1
2 TABLET, FILM COATED ORAL
Qty: 28 | Refills: 0
Start: 2019-11-21 | End: 2019-11-27

## 2019-11-21 RX ORDER — APIXABAN 2.5 MG/1
1 TABLET, FILM COATED ORAL
Qty: 14 | Refills: 0
Start: 2019-11-21 | End: 2019-11-27

## 2019-11-21 NOTE — CONSULT NOTE ADULT - SUBJECTIVE AND OBJECTIVE BOX
Vascular Surgery Consultation Note  =====================================================    HPI: 49y Male  HPI: 49 yr old male with no significant past medical history and a surgical history of L4-5 laminectomy for a lumbar disc herniation on 11/15/19, presenting to the ED with complaints of clot behind his right knee. During his prior hospital course, he was given heparin sub q, 5000u every 8 hours for DVT prophylaxis. After discharge on 11/16/19 he noticed RLE swelling. He called his surgeon who sent him for a lower extremity venous duplex. He presents with the official report reading acute right popliteal DVT. He endorses some leg swelling R greater than left. He is able to ambulate, and bend his knee fully. Denies chest pain, palpitations SOB, cough, wheeze, fevers, chills.      PAST MEDICAL & SURGICAL HISTORY:  Acid reflux  HTN (hypertension)    Home Meds: Home Medications:  Allegra 30 mg oral tablet:  (14 Nov 2019 18:43)  Benicar 20 mg oral tablet: 1 tab(s) orally once a day (14 Nov 2019 18:42)  gabapentin 600 mg oral tablet: 1 tab(s) orally once a day (at bedtime) (16 Nov 2019 15:54)  NexIUM 24HR 20 mg oral delayed release tablet: 1 tab(s) orally once a day (in the morning) (14 Nov 2019 18:43)  senna oral tablet: 2 tab(s) orally once a day (at bedtime), As needed, Constipation (16 Nov 2019 15:54)    Allergies: Allergies    No Known Allergies    Intolerances      Soc:   Denies Tobacco/EtOH/Substance use  Advanced Directives: Presumed Full Code     ROS:    REVIEW OF SYSTEMS    [x ] A ten-point review of systems was otherwise negative except as noted.  [ ] Due to altered mental status/intubation, subjective information were not able to be obtained from the patient. History was obtained, to the extent possible, from review of the chart and collateral sources of information.      CURRENT MEDICATIONS:   --------------------------------------------------------------------------------------  Neurologic Medications    Respiratory Medications    Cardiovascular Medications    Gastrointestinal Medications    Genitourinary Medications    Hematologic/Oncologic Medications    Antimicrobial/Immunologic Medications    Endocrine/Metabolic Medications    Topical/Other Medications    --------------------------------------------------------------------------------------    VITAL SIGNS, INS/OUTS (last 24 hours):  --------------------------------------------------------------------------------------  ICU Vital Signs Last 24 Hrs  T(C): 37.1 (20 Nov 2019 20:28), Max: 37.1 (20 Nov 2019 20:28)  T(F): 98.7 (20 Nov 2019 20:28), Max: 98.7 (20 Nov 2019 20:28)  HR: 104 (20 Nov 2019 20:28) (104 - 104)  BP: 145/89 (20 Nov 2019 20:28) (145/89 - 145/89)  BP(mean): --  ABP: --  ABP(mean): --  RR: 18 (20 Nov 2019 20:28) (18 - 18)  SpO2: 97% (20 Nov 2019 20:28) (97% - 97%)    I&O's Summary    --------------------------------------------------------------------------------------  PHYSICAL EXAM  General: NAD, AAOx3, calm and cooperative  HEENT: NCAT, Trachea ML, Neck supple  Cardiac: RRR S1, S2  Respiratory: CTAB, normal respiratory effort, breath sounds equal BL, no wheeze, rhonchi or crackles  Abdomen: Soft, non-distended, non-tender  Musculoskeletal: Strength 5/5 BL UE/LE, ROM intact, compartments soft, RLE non-pitting edema, calf and thighs nontender bilaterally   Neuro: Sensation grossly intact and equal throughout, CN II-XII intact, no focal deficits  Vascular: Pulses 2+ throughout, extremities well perfused  Skin: Warm/dry, normal color, no jaundice      LABS  --------------------------------------------------------------------------------------  Labs:  CAPILLARY BLOOD GLUCOSE                              12.7   8.44  )-----------( 305      ( 20 Nov 2019 21:55 )             38.1       Auto Immature Granulocyte %: 0.9 % (11-20-19 @ 21:55)  Auto Neutrophil %: 68.8 % (11-20-19 @ 21:55)    11-20    140  |  98  |  19  ----------------------------<  107<H>  4.8   |  26  |  1.3      Calcium, Total Serum: 9.3 mg/dL (11-20-19 @ 21:55)      LFTs:             7.0  | 0.2  | 75       ------------------[122     ( 20 Nov 2019 21:55 )  4.5  | x    | 134         Lipase:x      Amylase:x             Coags:     10.80  ----< 0.94    ( 20 Nov 2019 21:55 )     27.5                      --------------------------------------------------------------------------------------  IMAGING RESULTS  OFFICIAL REPORT FROM [facility]:  ACUTE THROMBUS OF THE RIGHT POPLITEAL VEIN  Read by  Attending: Luz Elena Evans MD 11/20/2019 6:14PM  ---------------------------------------------------------------------------------------

## 2019-11-21 NOTE — CONSULT NOTE ADULT - ASSESSMENT
ASSESSMENT:  49 yr old male with no significant past medical history and a surgical history of L4-5 laminectomy for a lumbar disc herniation on 11/15/19, presenting to the ED with complaints of clot behind his right knee. He presents with an outpatient official report reading acute right popliteal DVT.     PLAN:   No vascular intervention at this time  Recommend leg elevation and compression  He should be started on Eliquis for 6 months in duration  Eliquis: 10 mg PO BID for the first seven days, then 5 mg PO BID thereafter   Clear for discharge from a Vascular Surgery standpoint  Patient to follow up in office with Dr. Saldaña      Thank you for the opportunity to participate in the care of your patient.

## 2019-11-22 ENCOUNTER — INBOUND DOCUMENT (OUTPATIENT)
Age: 49
End: 2019-11-22

## 2019-11-25 ENCOUNTER — APPOINTMENT (OUTPATIENT)
Dept: VASCULAR SURGERY | Facility: CLINIC | Age: 49
End: 2019-11-25
Payer: COMMERCIAL

## 2019-11-25 VITALS
HEIGHT: 71 IN | DIASTOLIC BLOOD PRESSURE: 70 MMHG | BODY MASS INDEX: 27.3 KG/M2 | SYSTOLIC BLOOD PRESSURE: 130 MMHG | WEIGHT: 195 LBS

## 2019-11-25 DIAGNOSIS — I10 ESSENTIAL (PRIMARY) HYPERTENSION: ICD-10-CM

## 2019-11-25 DIAGNOSIS — Z82.49 FAMILY HISTORY OF ISCHEMIC HEART DISEASE AND OTHER DISEASES OF THE CIRCULATORY SYSTEM: ICD-10-CM

## 2019-11-25 DIAGNOSIS — K21.9 GASTRO-ESOPHAGEAL REFLUX DISEASE W/OUT ESOPHAGITIS: ICD-10-CM

## 2019-11-25 PROCEDURE — 99203 OFFICE O/P NEW LOW 30 MIN: CPT

## 2019-11-25 RX ORDER — ESOMEPRAZOLE MAGNESIUM 5 MG/1
GRANULE, DELAYED RELEASE ORAL
Refills: 0 | Status: ACTIVE | COMMUNITY

## 2019-11-25 RX ORDER — LORATADINE 5 MG/5 ML
SOLUTION, ORAL ORAL
Refills: 0 | Status: ACTIVE | COMMUNITY

## 2019-11-25 NOTE — ASSESSMENT
[FreeTextEntry1] : 50 y/o gentleman with provoked acute right popliteal vein DVT after lumbar discectomy and is on Eliquis currently, reports that swelling and right leg pain has been improving. I recommend at least 3-6 months of anticoagulation. I will see him back in 3 months time and repeat the venous duplex at the time. No restrictions on physical therapy.

## 2019-11-25 NOTE — CONSULT LETTER
[Dear  ___] : Dear  [unfilled], [Consult Letter:] : I had the pleasure of evaluating your patient, [unfilled]. [Please see my note below.] : Please see my note below. [FreeTextEntry2] : Dear Dr. Eren Costa,

## 2019-11-25 NOTE — HISTORY OF PRESENT ILLNESS
[FreeTextEntry1] : 48 y/o gentleman with lumbar disc herniation, developed right foot drop and pain and underwent discectomy on 11/15/19,post-operative right leg swelling and pain worsened, had venous duplex on 11/20/19 that showed acute right popliteal vein DVT and is on Eliquis currently, reports that swelling and right leg pain has been improving. Denies any prior h/o DVT or family h/o DVT.

## 2019-11-28 NOTE — ED PROVIDER NOTE - NS ED MD DISPO ISOLATION TYPES
There are no exam notes on file for this visit.    SUBJECTIVE  Shira Guthrie is a 65 year old female with past medical history significant for    Patient Active Problem List   Diagnosis     CAD (coronary artery disease)     MDD (major depressive disorder)     Chronic constipation     Stroke (H)     Benign essential hypertension     Osteoporosis     PAD (peripheral artery disease) (H)     Fracture closed of upper end of forearm     Synovial sarcoma (H)     Esophageal stricture     Anginal pain (H)     Health Care Home     B12 deficiency     Cataracts, bilateral     Malignant neoplasm of connective and other soft tissue     Chronic pain syndrome     Other polyneuropathy     Primary osteoarthritis involving multiple joints     Lymphedema of left leg     False positive serological test for hepatitis C     Cervical radiculopathy at C7     Rotator cuff impingement syndrome of right shoulder     H/O hysterectomy for benign disease     Problems related to other legal circumstances     Others present at the visit:  None    Presents for   Chief Complaint   Patient presents with     RECHECK     Follow up bronchitis     Results     x ray results, and bowel obstruction radiology results.      other     Swollen left knee     Patient presents today to follow-up on her visit last month for bronchitis.  She shares that it took her a little while to get better.  Does still have an occasional cough, and did get a refill on the guaifenesin, which she found to be helpful.  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  She would like to make sure she has all the medications that she needs in order to do well while in [place].    She continues to see Dr. lane regularly for chronic pain management, and shares that the pain continues to be a problem.  She still has some of the oxycodone that I gave to her at the last  visit available.  She has an upcoming scheduled to see Dr. Pierre on December 2 to discuss this.  She is planning to leave on December 4 to travel down to [place].  She is reporting continued pain bilaterally in her knees, especially in the kneecap after the fall she suffered this summer.  Did have quite a bit of bruising discomfort and swelling in her left ankle after she fell in the clinic in getting x-rays last visit.  This has mostly resolved at this point in time.  She has chronic pain and difficulty in her left elbow and left rotator cuff and these are still a problem.  She shares that she is doing yoga and regular exercises as well as using parent support from her family.  She plans to be gone for 2 months, and wonders if she needs a referral to a pain clinic in order to do that in case she needs medications while she is in [place] or if there might be opportunity for her to get an extended prescription here.  She currently takes both tramadol and oxycodone and has a pain contract with Dr. Pierre.    She shares that she has had significant difficulty with a family storage unit.  Is still dealing with the death of her son, and a edwards with her ex-daughter-in-law who also feels that she has ownership of this storage locker in the items inside it.  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  This has been very stressful and she is looking forward to getting out of town, but needs to make sure she takes care of the last few payments on the storage locker prior to the new year.    She also wanted to review her x-ray upper GI with small bowel follow-through.  She continues to eat a fairly bland diet including many soups, chicken, and soft foods.  She has done some chili as well.  Has been able to maintain her weight and is not vomiting currently.  We discussed the study showed some spasm but no obstruction, and no esophageal stricture or narrowing.  Also reviewed the chest x-ray done last  "time that was negative for pneumonia.  She was given printed copies of this so that she can have these results available for her while she travels.    OBJECTIVE:  Vitals: /73 (BP Location: Left arm)   Pulse 76   Temp 98  F (36.7  C) (Oral)   Resp 16   Ht 1.61 m (5' 3.39\")   Wt 83.8 kg (184 lb 12.8 oz)   SpO2 98%   BMI 32.34 kg/m    BMI= Body mass index is 32.34 kg/m .    Vitals:  Vitals are reviewed and are within the normal range  Gen:  Alert, pleasant, no acute distress  Psych: Patient does seem somewhat depressed.  Struggling with the loss of her son in the relationship with her ex daughter-in-law.  Insight is fairly good.  She is somewhat tangential.  Cardiac:  Regular rate and rhythm, no murmurs, rubs or gallops  Respiratory:  Lungs clear to auscultation bilaterally  Abdomen:  Soft, non-tender, non-distended, bowel sounds positive  Extremities: Bilateral ankles with trace to 1+ edema.  Some swelling in her upper legs as well.  Crepitus over the knees.  Bruising from the injury has since resolved.      ASSESSMENT AND PLAN:      Shira was seen today for recheck, results and other.  Multiple issues discussed.  Ultimately, patient has a visit scheduled on December 2 with her regular primary care and chronic pain prescribing provider Dr. lane.  They have a good relationship, and so I will defer prescriptions for narcotics until that visit.  I did tell patient that I would connect with Dr. lane prior to the visit and will route her this note.    Diagnoses and all orders for this visit:    Cough.   Lungs are clear.  No wheezing.  Will refill cough suppressant to be used as needed, but I do think her infection has resolved.  -     guaiFENesin-dextromethorphan (ROBITUSSIN DM) 100-10 MG/5ML syrup; Take 5 mLs by mouth every 4 hours as needed for cough    Esophageal stricture.  Discussed results from the x-ray small bowel with follow-through.  This shows spasm, which could indicate an etiology for some of " her symptoms.  Weight is been stable and while she is had to limit her diet, she is doing okay with oral intake.  Would be okay to restart her Linzess.    Chronic pain syndrome. Acute pain of left knee  Discussed with patient that given I am not her prescribing provider, I would not give her prescription today, but that I will reach out and connect with Dr. pierre about this.    Bereavement.  Still struggling with the death of her son as well as relationship with the ex-daughter-in-law.  She does have a good relationship with Dr. Pierre and will discuss this with her as well on Monday.  I am not sure there is much we can do except provide continued listening ear and emotional support.        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    Plan for pain medicine to cover for 1-2 month while she is on her trip.    Tramadol for the arthritis, oxycodone for the knee and hand and elbow.    Continuing to do exercises.    Make sure urine is okay  Make sure that breathing/cough is okay  Make sure that right hand pain  Talk about the results from the xray--this all looks good.      Follow up in 1 week for CPM visit with Dr. Pierre.      Sandrine Taylor MD       None

## 2019-12-02 ENCOUNTER — RX RENEWAL (OUTPATIENT)
Age: 49
End: 2019-12-02

## 2020-02-24 ENCOUNTER — APPOINTMENT (OUTPATIENT)
Dept: VASCULAR SURGERY | Facility: CLINIC | Age: 50
End: 2020-02-24

## 2020-02-27 ENCOUNTER — APPOINTMENT (OUTPATIENT)
Dept: VASCULAR SURGERY | Facility: CLINIC | Age: 50
End: 2020-02-27
Payer: COMMERCIAL

## 2020-02-27 VITALS
WEIGHT: 200 LBS | HEIGHT: 71 IN | BODY MASS INDEX: 28 KG/M2 | SYSTOLIC BLOOD PRESSURE: 126 MMHG | DIASTOLIC BLOOD PRESSURE: 70 MMHG

## 2020-02-27 DIAGNOSIS — I82.409 ACUTE EMBOLISM AND THROMBOSIS OF UNSPECIFIED DEEP VEINS OF UNSPECIFIED LOWER EXTREMITY: ICD-10-CM

## 2020-02-27 DIAGNOSIS — M79.89 OTHER SPECIFIED SOFT TISSUE DISORDERS: ICD-10-CM

## 2020-02-27 PROCEDURE — 99213 OFFICE O/P EST LOW 20 MIN: CPT

## 2020-02-27 PROCEDURE — 93971 EXTREMITY STUDY: CPT

## 2020-02-27 NOTE — HISTORY OF PRESENT ILLNESS
[FreeTextEntry1] : 50 y/o gentleman with lumbar disc herniation, developed right foot drop and pain and underwent discectomy on 11/15/19,post-operative right leg swelling and pain worsened, had venous duplex on 11/20/19 that showed acute right popliteal vein DVT and has been on Eliquis currently for 3 months now, reports that swelling and right leg pain has been improving. Denies any prior h/o DVT or family h/o DVT.

## 2020-02-27 NOTE — DATA REVIEWED
[FreeTextEntry1] : I performed a venous duplex which was medically necessary to evaluate for resolution of DVT. It showed no evidence of DVT in the right lower extremity.\par

## 2020-02-27 NOTE — ASSESSMENT
[FreeTextEntry1] : 50 y/o gentleman with lumbar disc herniation, developed right foot drop and pain and underwent discectomy on 11/15/19,post-operative right leg swelling and pain worsened, had venous duplex on 11/20/19 that showed acute right popliteal vein DVT and has been on Eliquis currently for 3 months now, reports that swelling and right leg pain has been improving. Denies any prior h/o DVT or family h/o DVT.\par I performed a venous duplex which was medically necessary to evaluate for resolution of DVT. It showed no evidence of DVT in the right lower extremity.\par I have advised him to stop Eliquis and I will see him back in 6 months time and repeat the venous duplex at the time.

## 2020-03-09 NOTE — PHYSICAL THERAPY INITIAL EVALUATION ADULT - LEVEL OF INDEPENDENCE: SIT/STAND, REHAB EVAL
[facility] Medicine History & Physical      PCP: Madelyn Mitchell APRN - CNP    Date of Admission: 3/9/2020    Date of Service: Pt seen/examined on 3/9/2020    Chief Complaint:    Chief Complaint   Patient presents with    Shortness of Breath     MD office sent to ED for evaluation d/t SOB. was dx with COPD exac on friday and put on doxycycline. History Of Present Illness: The patient is a 61 y.o. male with COPD, hx of alcohol abuse, OANH, tobacco abuse who presented to [facility] ED with complaint of SOB. Patient reports that this is been ongoing for a week. He states that he has had significantly increased shortness of breath from his baseline and is been using his inhalers without any improvement. He is a 3 pack/day smoker but is only smoked a couple cigarettes over the last week because of his shortness of breath. He states he has had increased wheezing and a cough with a rattling sensation in his chest but is never able to cough anything up. He states he has soreness across his chest from coughing but denies any other pressure-like chest pain. He denies any fevers at home. He did contact with some sick grandchildren recently. Patient reports that he was diagnosed with a COPD exacerbation and started on medications on Friday but his symptoms just continued to worsen.      Past Medical History:        Diagnosis Date    Anxiety 1/6/2020    Chronic obstructive pulmonary disease ([address] 9/3/2019    PFT done 9/03/19    Crush injury of right foot 7/23/2015    DDD (degenerative disc disease), lumbar 1/6/2020    Erectile dysfunction 1/6/2020    Fatigue 1/6/2020    History of alcohol abuse 1/6/2020    History of drug use     HNP (herniated nucleus pulposus), lumbar 1/6/2020    Hyperglycemia 1/6/2020    Hypersomnia 1/6/2020    Kidney stone     Lumbar radiculopathy 1/6/2020    Lumbar spine pain 1/6/2020    Observed sleep apnea 1/6/2020    Reactive depression 1/6/2020    Spondylosis without myelopathy or radiculopathy, lumbar region 1/6/2020    Tobacco use 1/6/2020       Past Surgical History:        Procedure Laterality Date    CHOLECYSTECTOMY      CYSTOSCOPY  03/16/2011    cystoscopy left ureteroscopy, left retrograde, double J stent placement    PAIN MANAGEMENT PROCEDURE Right 12/24/2019    RIGHT LUMBAR FIVE SACRAL ONE TRANSFORAMINAL EPIDURAL STEROID INJECTION SITE CONFIRMED BY FLUOROSCOPY performed by Hair Celis MD at [facility] Right 1/7/2020    RIGHT LUMBAR FOUR AND LUMBAR FIVE TRANSFORAMINAL EPIDURAL STEROID INJECTION SITE CONFIRMED BY FLUOROSCOPY performed by Hair Celis MD at [place] [address]       Medications Prior to Admission:    Prior to Admission medications    Medication Sig Start Date End Date Taking? Authorizing Provider   PARoxetine (PAXIL) 40 MG tablet TAKE ONE TABLET BY MOUTH EVERY EVENING 3/6/20   Gary Hard, ANICETO - CNP   doxycycline hyclate (VIBRA-TABS) 100 MG tablet Take 1 tablet by mouth 2 times daily for 10 days 3/6/20 3/16/20  Gary Hard, ANICETO - CNP   predniSONE (DELTASONE) 10 MG tablet Take 40 mg for 3 days then 30 mg for 3 days then 20 mg for 3 days then 10 mg for 3 days 3/6/20   Agry Hard, ANICETO - CNP   promethazine-dextromethorphan (PROMETHAZINE-DM) 6.25-15 MG/5ML syrup Take 5 mLs by mouth 4 times daily as needed for Cough DO NOT DRIVE OR OPERATE MACHINERY OR FIREARMS WITH MEDICATION 3/6/20 3/13/20  Gary Hard, ANICETO - CNP   gabapentin (NEURONTIN) 100 MG capsule Take 1 capsule by mouth 3 times daily for 90 days.  Intended supply: 90 days 1/6/20 4/5/20  ANICETO Yuen CNP   ibuprofen (ADVIL;MOTRIN) 800 MG tablet Take 1 tablet by mouth 3 times daily 12/17/19 3/6/20  RYAN Quiroz   tiotropium (SPIRIVA RESPIMAT) 2.5 MCG/ACT AERS inhaler Inhale 2 puffs into the lungs daily 11/4/19   ANICETO Yuen CNP   buPROPion [facility]) 150 MG extended release independent

## 2020-05-29 NOTE — PHYSICAL THERAPY INITIAL EVALUATION ADULT - RANGE OF MOTION EXAMINATION, REHAB EVAL
no Left UE ROM was WNL (within normal limits)/Right UE ROM was WNL (within normal limits)/Left LE ROM was WFL (within functional limits)/R foot to neutral

## 2021-07-19 ENCOUNTER — INPATIENT (INPATIENT)
Facility: HOSPITAL | Age: 51
LOS: 6 days | Discharge: HOME | End: 2021-07-26
Attending: STUDENT IN AN ORGANIZED HEALTH CARE EDUCATION/TRAINING PROGRAM | Admitting: STUDENT IN AN ORGANIZED HEALTH CARE EDUCATION/TRAINING PROGRAM
Payer: COMMERCIAL

## 2021-07-19 VITALS
OXYGEN SATURATION: 98 % | RESPIRATION RATE: 18 BRPM | SYSTOLIC BLOOD PRESSURE: 132 MMHG | DIASTOLIC BLOOD PRESSURE: 76 MMHG | HEART RATE: 86 BPM | TEMPERATURE: 98 F

## 2021-07-19 LAB
ANION GAP SERPL CALC-SCNC: 13 MMOL/L — SIGNIFICANT CHANGE UP (ref 7–14)
BASOPHILS # BLD AUTO: 0.01 K/UL — SIGNIFICANT CHANGE UP (ref 0–0.2)
BASOPHILS NFR BLD AUTO: 0.1 % — SIGNIFICANT CHANGE UP (ref 0–1)
BUN SERPL-MCNC: 13 MG/DL — SIGNIFICANT CHANGE UP (ref 10–20)
CALCIUM SERPL-MCNC: 9.6 MG/DL — SIGNIFICANT CHANGE UP (ref 8.5–10.1)
CHLORIDE SERPL-SCNC: 100 MMOL/L — SIGNIFICANT CHANGE UP (ref 98–110)
CO2 SERPL-SCNC: 21 MMOL/L — SIGNIFICANT CHANGE UP (ref 17–32)
CREAT SERPL-MCNC: 0.9 MG/DL — SIGNIFICANT CHANGE UP (ref 0.7–1.5)
EOSINOPHIL # BLD AUTO: 0.04 K/UL — SIGNIFICANT CHANGE UP (ref 0–0.7)
EOSINOPHIL NFR BLD AUTO: 0.4 % — SIGNIFICANT CHANGE UP (ref 0–8)
ERYTHROCYTE [SEDIMENTATION RATE] IN BLOOD: 2 MM/HR — SIGNIFICANT CHANGE UP (ref 0–10)
GLUCOSE SERPL-MCNC: 108 MG/DL — HIGH (ref 70–99)
HCT VFR BLD CALC: 43.6 % — SIGNIFICANT CHANGE UP (ref 42–52)
HGB BLD-MCNC: 14.6 G/DL — SIGNIFICANT CHANGE UP (ref 14–18)
IMM GRANULOCYTES NFR BLD AUTO: 0.4 % — HIGH (ref 0.1–0.3)
LYMPHOCYTES # BLD AUTO: 0.63 K/UL — LOW (ref 1.2–3.4)
LYMPHOCYTES # BLD AUTO: 6.1 % — LOW (ref 20.5–51.1)
MCHC RBC-ENTMCNC: 30.2 PG — SIGNIFICANT CHANGE UP (ref 27–31)
MCHC RBC-ENTMCNC: 33.5 G/DL — SIGNIFICANT CHANGE UP (ref 32–37)
MCV RBC AUTO: 90.1 FL — SIGNIFICANT CHANGE UP (ref 80–94)
MONOCYTES # BLD AUTO: 0.94 K/UL — HIGH (ref 0.1–0.6)
MONOCYTES NFR BLD AUTO: 9.2 % — SIGNIFICANT CHANGE UP (ref 1.7–9.3)
NEUTROPHILS # BLD AUTO: 8.59 K/UL — HIGH (ref 1.4–6.5)
NEUTROPHILS NFR BLD AUTO: 83.8 % — HIGH (ref 42.2–75.2)
NRBC # BLD: 0 /100 WBCS — SIGNIFICANT CHANGE UP (ref 0–0)
PLATELET # BLD AUTO: 248 K/UL — SIGNIFICANT CHANGE UP (ref 130–400)
POTASSIUM SERPL-MCNC: 5.7 MMOL/L — HIGH (ref 3.5–5)
POTASSIUM SERPL-SCNC: 5.7 MMOL/L — HIGH (ref 3.5–5)
RBC # BLD: 4.84 M/UL — SIGNIFICANT CHANGE UP (ref 4.7–6.1)
RBC # FLD: 11.9 % — SIGNIFICANT CHANGE UP (ref 11.5–14.5)
SARS-COV-2 RNA SPEC QL NAA+PROBE: SIGNIFICANT CHANGE UP
SODIUM SERPL-SCNC: 134 MMOL/L — LOW (ref 135–146)
WBC # BLD: 10.25 K/UL — SIGNIFICANT CHANGE UP (ref 4.8–10.8)
WBC # FLD AUTO: 10.25 K/UL — SIGNIFICANT CHANGE UP (ref 4.8–10.8)

## 2021-07-19 PROCEDURE — 93970 EXTREMITY STUDY: CPT | Mod: 26

## 2021-07-19 PROCEDURE — 99223 1ST HOSP IP/OBS HIGH 75: CPT

## 2021-07-19 PROCEDURE — 73630 X-RAY EXAM OF FOOT: CPT | Mod: 26,RT

## 2021-07-19 PROCEDURE — 99285 EMERGENCY DEPT VISIT HI MDM: CPT

## 2021-07-19 RX ORDER — LOSARTAN POTASSIUM 100 MG/1
50 TABLET, FILM COATED ORAL DAILY
Refills: 0 | Status: DISCONTINUED | OUTPATIENT
Start: 2021-07-19 | End: 2021-07-21

## 2021-07-19 RX ORDER — CEFAZOLIN SODIUM 1 G
1000 VIAL (EA) INJECTION ONCE
Refills: 0 | Status: COMPLETED | OUTPATIENT
Start: 2021-07-19 | End: 2021-07-19

## 2021-07-19 RX ORDER — ESOMEPRAZOLE MAGNESIUM 40 MG/1
1 CAPSULE, DELAYED RELEASE ORAL
Qty: 0 | Refills: 0 | DISCHARGE

## 2021-07-19 RX ORDER — KETOROLAC TROMETHAMINE 30 MG/ML
30 SYRINGE (ML) INJECTION ONCE
Refills: 0 | Status: DISCONTINUED | OUTPATIENT
Start: 2021-07-19 | End: 2021-07-19

## 2021-07-19 RX ORDER — FEXOFENADINE HCL 30 MG
0 TABLET ORAL
Qty: 0 | Refills: 0 | DISCHARGE

## 2021-07-19 RX ORDER — ENOXAPARIN SODIUM 100 MG/ML
40 INJECTION SUBCUTANEOUS DAILY
Refills: 0 | Status: DISCONTINUED | OUTPATIENT
Start: 2021-07-19 | End: 2021-07-26

## 2021-07-19 RX ORDER — PANTOPRAZOLE SODIUM 20 MG/1
40 TABLET, DELAYED RELEASE ORAL
Refills: 0 | Status: DISCONTINUED | OUTPATIENT
Start: 2021-07-19 | End: 2021-07-26

## 2021-07-19 RX ORDER — CEFAZOLIN SODIUM 1 G
2000 VIAL (EA) INJECTION EVERY 8 HOURS
Refills: 0 | Status: DISCONTINUED | OUTPATIENT
Start: 2021-07-19 | End: 2021-07-20

## 2021-07-19 RX ADMIN — Medication 100 MILLIGRAM(S): at 14:50

## 2021-07-19 RX ADMIN — Medication 30 MILLIGRAM(S): at 14:59

## 2021-07-19 RX ADMIN — Medication 100 MILLIGRAM(S): at 22:28

## 2021-07-19 NOTE — H&P ADULT - HISTORY OF PRESENT ILLNESS
50 year old male patient presented for right ankle dolor, calor, rubor, diagnosed with cellulitis, admitted for management.     Known hypertensive, GERD, history of right foot drop since 2019, history of RLL DVT in Jan 2021 received full course of apixaban and discontinued it.     Current history goes back to 4 days prior to presentation when the patient began to note gradual progression of erythema, followed by edema and then severe pain limiting range of motion. Patient admits fever of 101.4 on 2 days prior to presentation as well. 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 No paresthesias, focal weakness, or other associated complaints at present.   He is admitted for management.

## 2021-07-19 NOTE — ED PROVIDER NOTE - OBJECTIVE STATEMENT
51 y/o male h/o HTN, GERD, rt foot drop, RLE DVT s/p AC (Jan 2021) p/w rt ankle pain and swelling x 3-4 days, persistent, worse with certain movements and position, better with rest, + fever (Tm 101.4, oral), no chills, paresthesias, focal weakness, or other associated complaints at present. Pt denies recent heavy lifting or trauma.     Old chart reviewed.  I have reviewed and agree with the initial nursing note, except as documented in my note.

## 2021-07-19 NOTE — H&P ADULT - ATTENDING COMMENTS
49 YO M with a PMH of HTN, GERD, hx of R foot drop, and hx of RLL DVT (completed Apixaban course) who presents to the hospital with a c/o worsening right ankle redness over the past x 4 days. Associated with swelling/pain and fevers. Denies any trauma/falls. ROS negative except as above.     In the ED, XRs of RLE were negative for acute process. Dopplers of RLEs are negative. Cultures obtained, pt was started on IV ABXs (Cefezolin).     FMHx: Reviewed, not relevant    Physical exam shows pt in NAD. VSS, afebrile, not hypoxic on RA. A&Ox3. Non-focal neuro exam. Muscle strength/sensation intact. CTA B/L with no W/C/R. RRR, no M/G/R. ABD is soft and non-tender, normoactive BSs. LEs without swelling. Erythema of right ankle, circuferential, extending to shin; + warmth; +pustule on posterior aspect. Labs and radiology as above.    Cellulitis of RLE, worry for staph, less likely strep; SIRs present on admission. Send ESR/CRP. A1c and lipids. FU cultures. IV ABXs (Clinda).     HX of HTN, GERD, hx of R foot drop, and hx of RLL DVT (completed Apixaban course). Restart home meds, except as stated above. DVT PPX. Inform PCP of pt's admission to hospital. My note supersedes the residents note.     Date seen by Attendin21

## 2021-07-19 NOTE — PHARMACOTHERAPY INTERVENTION NOTE - COMMENTS
Asked if MD would like to hold or continue losartan since K level is 5.7 (but hemolyzed). MD will continue therapy and monitor potassium

## 2021-07-19 NOTE — ED PROVIDER NOTE - NS ED ROS FT
Eyes: No visual changes, eye pain or discharge.  ENMT: No hearing changes, pain, discharge or infections.  Cardiac: No chest pain, SOB or edema.  Respiratory: No cough or respiratory distress.   GI: No nausea, vomiting, diarrhea or abdominal pain.  : No dysuria, frequency or burning.  Neuro: No headache or focal weakness. No LOC.  Skin: No skin rash.   Except as documented in the HPI, all other systems are negative.

## 2021-07-19 NOTE — H&P ADULT - ASSESSMENT
50 year old male patient presented for right ankle dolor, calor, rubor, diagnosed with cellulitis, admitted for management.     Known hypertensive, GERD, history of right foot drop since 2019, history of RLL DVT in Jan 2021 received full course of apixaban and discontinued it.     # Cellulitis - No osteomyelitis  - Cefazolin 2g q8h  -  Xray Foot AP + Lateral + Oblique, Right (07.19.21 @ 13:46) > There is no evidence of acute fracture or dislocation. Mild degenerative changes of the first MTP joint. Mild generalized soft tissue swelling.  - Possible entry point on posterior aspect, might be bug bite?     # Hypertension  - On olmesartan at home  - On losartan here    # Foot drop  - Stable    # GERD  - On pantoprazole    # Diet > DASH/TLC  # DVT > Lovenox

## 2021-07-19 NOTE — ED PROVIDER NOTE - PHYSICAL EXAMINATION
VSS, awake, alert, in NAD, chest CTAB, +S1/S2, RRR, abd soft, NT, no skin lacerations or abrasions. RLE; no hip, knee (including prox tib-fib) tenderness, rt ankle/foot; + swelling, ttp and erythema of foot, ankle and prox calf, no gross deformity, crepitus, ROM intact, no joint laxity appreciated, motor and sensation intact distally, NV intact with 2+ DP pulses. There is no pain on palpation of the achilles tendon. + warmth, erythema, no induration, fluctuance, lymphangitis or purulence, alert, clear speech.

## 2021-07-19 NOTE — H&P ADULT - NSHPLABSRESULTS_GEN_ALL_CORE
LABS:                        14.6   10.25 )-----------( 248      ( 19 Jul 2021 12:10 )             43.6     07-19    134<L>  |  100  |  13  ----------------------------<  108<H>  5.7<H>   |  21  |  0.9    Ca    9.6      19 Jul 2021 12:10

## 2021-07-20 LAB
ANION GAP SERPL CALC-SCNC: 14 MMOL/L — SIGNIFICANT CHANGE UP (ref 7–14)
BASOPHILS # BLD AUTO: 0.01 K/UL — SIGNIFICANT CHANGE UP (ref 0–0.2)
BASOPHILS NFR BLD AUTO: 0.1 % — SIGNIFICANT CHANGE UP (ref 0–1)
BUN SERPL-MCNC: 16 MG/DL — SIGNIFICANT CHANGE UP (ref 10–20)
CALCIUM SERPL-MCNC: 9 MG/DL — SIGNIFICANT CHANGE UP (ref 8.5–10.1)
CHLORIDE SERPL-SCNC: 100 MMOL/L — SIGNIFICANT CHANGE UP (ref 98–110)
CO2 SERPL-SCNC: 24 MMOL/L — SIGNIFICANT CHANGE UP (ref 17–32)
COVID-19 SPIKE DOMAIN AB INTERP: POSITIVE
COVID-19 SPIKE DOMAIN ANTIBODY RESULT: >250 U/ML — HIGH
CREAT SERPL-MCNC: 0.8 MG/DL — SIGNIFICANT CHANGE UP (ref 0.7–1.5)
EOSINOPHIL # BLD AUTO: 0.04 K/UL — SIGNIFICANT CHANGE UP (ref 0–0.7)
EOSINOPHIL NFR BLD AUTO: 0.5 % — SIGNIFICANT CHANGE UP (ref 0–8)
GLUCOSE SERPL-MCNC: 110 MG/DL — HIGH (ref 70–99)
HCT VFR BLD CALC: 40.6 % — LOW (ref 42–52)
HGB BLD-MCNC: 13.4 G/DL — LOW (ref 14–18)
IMM GRANULOCYTES NFR BLD AUTO: 0.4 % — HIGH (ref 0.1–0.3)
LYMPHOCYTES # BLD AUTO: 1.27 K/UL — SIGNIFICANT CHANGE UP (ref 1.2–3.4)
LYMPHOCYTES # BLD AUTO: 16.2 % — LOW (ref 20.5–51.1)
MAGNESIUM SERPL-MCNC: 2 MG/DL — SIGNIFICANT CHANGE UP (ref 1.8–2.4)
MCHC RBC-ENTMCNC: 30.1 PG — SIGNIFICANT CHANGE UP (ref 27–31)
MCHC RBC-ENTMCNC: 33 G/DL — SIGNIFICANT CHANGE UP (ref 32–37)
MCV RBC AUTO: 91.2 FL — SIGNIFICANT CHANGE UP (ref 80–94)
MONOCYTES # BLD AUTO: 0.84 K/UL — HIGH (ref 0.1–0.6)
MONOCYTES NFR BLD AUTO: 10.7 % — HIGH (ref 1.7–9.3)
NEUTROPHILS # BLD AUTO: 5.66 K/UL — SIGNIFICANT CHANGE UP (ref 1.4–6.5)
NEUTROPHILS NFR BLD AUTO: 72.1 % — SIGNIFICANT CHANGE UP (ref 42.2–75.2)
NRBC # BLD: 0 /100 WBCS — SIGNIFICANT CHANGE UP (ref 0–0)
PLATELET # BLD AUTO: 263 K/UL — SIGNIFICANT CHANGE UP (ref 130–400)
POTASSIUM SERPL-MCNC: 4.3 MMOL/L — SIGNIFICANT CHANGE UP (ref 3.5–5)
POTASSIUM SERPL-SCNC: 4.3 MMOL/L — SIGNIFICANT CHANGE UP (ref 3.5–5)
RBC # BLD: 4.45 M/UL — LOW (ref 4.7–6.1)
RBC # FLD: 11.8 % — SIGNIFICANT CHANGE UP (ref 11.5–14.5)
SARS-COV-2 IGG+IGM SERPL QL IA: >250 U/ML — HIGH
SARS-COV-2 IGG+IGM SERPL QL IA: POSITIVE
SODIUM SERPL-SCNC: 138 MMOL/L — SIGNIFICANT CHANGE UP (ref 135–146)
WBC # BLD: 7.85 K/UL — SIGNIFICANT CHANGE UP (ref 4.8–10.8)
WBC # FLD AUTO: 7.85 K/UL — SIGNIFICANT CHANGE UP (ref 4.8–10.8)

## 2021-07-20 PROCEDURE — 99233 SBSQ HOSP IP/OBS HIGH 50: CPT

## 2021-07-20 RX ORDER — IBUPROFEN 200 MG
400 TABLET ORAL EVERY 6 HOURS
Refills: 0 | Status: DISCONTINUED | OUTPATIENT
Start: 2021-07-20 | End: 2021-07-26

## 2021-07-20 RX ORDER — KETOROLAC TROMETHAMINE 30 MG/ML
15 SYRINGE (ML) INJECTION ONCE
Refills: 0 | Status: DISCONTINUED | OUTPATIENT
Start: 2021-07-20 | End: 2021-07-20

## 2021-07-20 RX ORDER — OXYCODONE AND ACETAMINOPHEN 5; 325 MG/1; MG/1
1 TABLET ORAL EVERY 6 HOURS
Refills: 0 | Status: DISCONTINUED | OUTPATIENT
Start: 2021-07-20 | End: 2021-07-21

## 2021-07-20 RX ADMIN — OXYCODONE AND ACETAMINOPHEN 1 TABLET(S): 5; 325 TABLET ORAL at 23:40

## 2021-07-20 RX ADMIN — Medication 400 MILLIGRAM(S): at 17:27

## 2021-07-20 RX ADMIN — PANTOPRAZOLE SODIUM 40 MILLIGRAM(S): 20 TABLET, DELAYED RELEASE ORAL at 05:58

## 2021-07-20 RX ADMIN — Medication 100 MILLIGRAM(S): at 05:57

## 2021-07-20 RX ADMIN — Medication 100 MILLIGRAM(S): at 21:41

## 2021-07-20 RX ADMIN — ENOXAPARIN SODIUM 40 MILLIGRAM(S): 100 INJECTION SUBCUTANEOUS at 11:29

## 2021-07-20 RX ADMIN — LOSARTAN POTASSIUM 50 MILLIGRAM(S): 100 TABLET, FILM COATED ORAL at 05:58

## 2021-07-20 RX ADMIN — Medication 15 MILLIGRAM(S): at 15:26

## 2021-07-20 RX ADMIN — Medication 100 MILLIGRAM(S): at 13:10

## 2021-07-20 RX ADMIN — Medication 400 MILLIGRAM(S): at 19:03

## 2021-07-20 RX ADMIN — Medication 15 MILLIGRAM(S): at 06:28

## 2021-07-20 RX ADMIN — Medication 400 MILLIGRAM(S): at 23:52

## 2021-07-20 RX ADMIN — Medication 15 MILLIGRAM(S): at 13:36

## 2021-07-20 RX ADMIN — Medication 15 MILLIGRAM(S): at 05:57

## 2021-07-20 NOTE — PROGRESS NOTE ADULT - SUBJECTIVE AND OBJECTIVE BOX
EFRAÍN BRADFORD  50y, Male  Allergy: No Known Allergies    Hospital Day: 1d    Patient seen and examined earlier today. Febrile overnight to 101    PMH/PSH:  PAST MEDICAL & SURGICAL HISTORY:  HTN (hypertension)    Acid reflux        VITALS:  T(F): 99.8 (07-20-21 @ 05:37), Max: 101 (07-20-21 @ 00:35)  HR: 85 (07-20-21 @ 05:37)  BP: 123/62 (07-20-21 @ 05:37) (123/62 - 130/80)  RR: 18 (07-20-21 @ 05:37)  SpO2: 97% (07-20-21 @ 01:55)    TESTS & MEASUREMENTS:  Weight (Kg): 90.5 (07-20-21 @ 01:55)  BMI (kg/m2): 27.8 (07-20)    07-19-21 @ 07:01  -  07-20-21 @ 07:00  --------------------------------------------------------  IN: 0 mL / OUT: 300 mL / NET: -300 mL    07-20-21 @ 07:01  -  07-20-21 @ 12:17  --------------------------------------------------------  IN: 0 mL / OUT: 400 mL / NET: -400 mL                            13.4   7.85  )-----------( 263      ( 20 Jul 2021 06:05 )             40.6       07-20    138  |  100  |  16  ----------------------------<  110<H>  4.3   |  24  |  0.8    Ca    9.0      20 Jul 2021 06:05  Mg     2.0     07-20                  RADIOLOGY & ADDITIONAL TESTS:    RECENT DIAGNOSTIC ORDERS:  COVID-19 Matt Domain Antibody: AM Sched. Collection: 20-Jul-2021 04:30 (07-19-21 @ 18:57)  Diet, DASH/TLC:   Sodium & Cholesterol Restricted (07-19-21 @ 16:01)  Blood Gas Profile w/Lytes - Venous: STAT (07-19-21 @ 14:54)      MEDICATIONS:  MEDICATIONS  (STANDING):  clindamycin IVPB 600 milliGRAM(s) IV Intermittent every 12 hours  enoxaparin Injectable 40 milliGRAM(s) SubCutaneous daily  losartan 50 milliGRAM(s) Oral daily  pantoprazole    Tablet 40 milliGRAM(s) Oral before breakfast    MEDICATIONS  (PRN):      HOME MEDICATIONS:  Benicar 20 mg oral tablet (07-19)  NexIUM 40 mg oral delayed release capsule (07-19)      REVIEW OF SYSTEMS:  All other review of systems is negative unless indicated above.     PHYSICAL EXAM:  GENERAL: NAD  HEENT: No Swelling  CHEST/LUNG: Good air entry, No wheezing  HEART: RRR, No murmurs  ABDOMEN: Soft, Bowel sounds present  EXTREMITIES:  No clubbing, Right ankle erythema and edema, tender to palpation

## 2021-07-20 NOTE — PROGRESS NOTE ADULT - ASSESSMENT
51 YO M with a PMH of HTN, GERD, hx of R foot drop, and hx of RLL DVT (completed Apixaban course) who presents to the hospital with a c/o worsening right ankle redness over the past x 4 days.  Currently being treated for cellulitis    #Rt ankle cellulitis  Febrile to 101 overnight  Pt endorses some improvement of rt ankle pain and redness  - Cont IV clindamycin 600mg TID  - f/u blood cx    #HTN  - Cont losartan 50mg qD    #GERD  - Cont protonix    DVT PPX, Lovenox    #Progress Note Handoff  Pending (specify): Cont IV abx, Bcx, improvement  Family discussion: d/w pt regarding continuing IV abx therapy  Disposition: Home, possibly in 24h if pt's symptoms continue to improve and is afebrile 24h

## 2021-07-20 NOTE — PROGRESS NOTE ADULT - SUBJECTIVE AND OBJECTIVE BOX
EFRAÍN BRADFORD 50y Male  MRN#: [id number]   CODE STATUS: FULL      SUBJECTIVE  Patient is a 50y old Male who presents with a chief complaint of Right ankle edema, pain, erythema (20 Jul 2021 12:17)  Currently admitted to medicine with the primary diagnosis of Cellulitis    Today is hospital day 1d, and this morning he is resting in bed comfortably and reports no overnight events.     Present Today:           [place] Catheter (x)No/ ()Yes? Indication:          Central Line (x)No/ ()Yes? Indication:          IV Fluids (x)No/ ()Yes? Type:  Rate:  Indication:      OBJECTIVE  PAST MEDICAL & SURGICAL HISTORY  HTN (hypertension)    Acid reflux      Home Meds:  Benicar 20 mg oral tablet: 1 tab(s) orally once a day  NexIUM 40 mg oral delayed release capsule: 1 cap(s) orally once a day    ALLERGIES:  No Known Allergies    MEDICATIONS:  STANDING MEDICATIONS  clindamycin IVPB 300 milliGRAM(s) IV Intermittent every 8 hours  enoxaparin Injectable 40 milliGRAM(s) SubCutaneous daily  losartan 50 milliGRAM(s) Oral daily  pantoprazole    Tablet 40 milliGRAM(s) Oral before breakfast    PRN MEDICATIONS      VITAL SIGNS: Last 24 Hours  T(C): 37.7 (20 Jul 2021 05:37), Max: 38.3 (20 Jul 2021 00:35)  T(F): 99.8 (20 Jul 2021 05:37), Max: 101 (20 Jul 2021 00:35)  HR: 85 (20 Jul 2021 05:37) (85 - 91)  BP: 123/62 (20 Jul 2021 05:37) (123/62 - 130/80)  BP(mean): --  RR: 18 (20 Jul 2021 05:37) (18 - 18)  SpO2: 97% (20 Jul 2021 01:55) (96% - 97%)    LABS:                        13.4   7.85  )-----------( 263      ( 20 Jul 2021 06:05 )             40.6     07-20    138  |  100  |  16  ----------------------------<  110<H>  4.3   |  24  |  0.8    Ca    9.0      20 Jul 2021 06:05  Mg     2.0     07-20                    RADIOLOGY:  < from: Xray Foot AP + Lateral + Oblique, Right (07.19.21 @ 13:46) >  Impression:  No acute fracture or dislocation.    --- End of Report ---    < end of copied text >  < from: [facility] Duplex Lower Ext Vein Scan, Bilat (07.19.21 @ 13:17) >  Impression:    No evidence of deep venousthrombosis or superficial thrombophlebitis in the bilateral lower extremities.      < end of copied text >      PHYSICAL EXAM:  General: NAD, AAOx3  HEENT: atraumatic, normocephalic, EOMI, PERRLA, conjunctiva and sclera clear, No JVD  Pulmonary: clear to auscultation bilaterally; No wheeze  Cardiovascular: Regular rate and rhythm; no murmurs, rubs or gallops. Normal S1S2  Gastrointestinal: Soft, nontender, nondistended; bowel sounds present  Musculoskeletal: 2+ peripheral pulses, no clubbing, cyanosis or edema  Neurology: non-focal, Pt. alert and oriented, fluent speech, able to move all extremities  Skin: +erythema and swelling of R. lateral ankle with pain on active ROM. Pt. denies paraesthesias with passive ROM

## 2021-07-20 NOTE — PROGRESS NOTE ADULT - ASSESSMENT
ADMISSION SUMMARY  51 YO M with a PMH of HTN, GERD, hx of R foot drop, and hx of RLL DVT (completed Apixaban course) who presents to the hospital with a c/o worsening right ankle redness over the past x 4 days. Associated with swelling/pain and fevers. Denies any trauma/falls. ROS negative except as above. In the ED, XRs of RLE were negative for acute process. Dopplers of RLEs are negative. Cultures obtained, pt was started on IV ABXs.       #Cellulitis  - hx of R. foot drop and decreased sensation in R. foot.   - Erythema and edema of R. lateral ankle with pain on active ROM  - pt. denies paresthesias with passive ROM  - foot x-ray no evidence for fracture or dislocation  - pt. afebrile since 00:35 7/20  - cont. Clindamycin IV q12  - f/u blood cultures    #HTN  - cont. losartan 50mg po daily    #GERD  - cont. protonix 40mg po daily      Dvt ppx: lovenox 40mg  PPI ppx: protonix 40mg  Diet: DASH  Dispo:  Home, possibly in 24h if pt's symptoms continue to improve and is afebrile for 24h  Activity: as tolerated

## 2021-07-21 LAB
A1C WITH ESTIMATED AVERAGE GLUCOSE RESULT: 5 % — SIGNIFICANT CHANGE UP (ref 4–5.6)
ALBUMIN SERPL ELPH-MCNC: 4 G/DL — SIGNIFICANT CHANGE UP (ref 3.5–5.2)
ALP SERPL-CCNC: 222 U/L — HIGH (ref 30–115)
ALT FLD-CCNC: 101 U/L — HIGH (ref 0–41)
ANION GAP SERPL CALC-SCNC: 8 MMOL/L — SIGNIFICANT CHANGE UP (ref 7–14)
AST SERPL-CCNC: 57 U/L — HIGH (ref 0–41)
BASOPHILS # BLD AUTO: 0.02 K/UL — SIGNIFICANT CHANGE UP (ref 0–0.2)
BASOPHILS NFR BLD AUTO: 0.3 % — SIGNIFICANT CHANGE UP (ref 0–1)
BILIRUB SERPL-MCNC: 0.5 MG/DL — SIGNIFICANT CHANGE UP (ref 0.2–1.2)
BUN SERPL-MCNC: 16 MG/DL — SIGNIFICANT CHANGE UP (ref 10–20)
CALCIUM SERPL-MCNC: 9.1 MG/DL — SIGNIFICANT CHANGE UP (ref 8.5–10.1)
CHLORIDE SERPL-SCNC: 103 MMOL/L — SIGNIFICANT CHANGE UP (ref 98–110)
CO2 SERPL-SCNC: 29 MMOL/L — SIGNIFICANT CHANGE UP (ref 17–32)
CREAT SERPL-MCNC: 0.9 MG/DL — SIGNIFICANT CHANGE UP (ref 0.7–1.5)
EOSINOPHIL # BLD AUTO: 0.09 K/UL — SIGNIFICANT CHANGE UP (ref 0–0.7)
EOSINOPHIL NFR BLD AUTO: 1.3 % — SIGNIFICANT CHANGE UP (ref 0–8)
ESTIMATED AVERAGE GLUCOSE: 97 MG/DL — SIGNIFICANT CHANGE UP (ref 68–114)
GLUCOSE SERPL-MCNC: 97 MG/DL — SIGNIFICANT CHANGE UP (ref 70–99)
HCT VFR BLD CALC: 39.3 % — LOW (ref 42–52)
HGB BLD-MCNC: 12.9 G/DL — LOW (ref 14–18)
IMM GRANULOCYTES NFR BLD AUTO: 0.4 % — HIGH (ref 0.1–0.3)
LYMPHOCYTES # BLD AUTO: 0.87 K/UL — LOW (ref 1.2–3.4)
LYMPHOCYTES # BLD AUTO: 12.4 % — LOW (ref 20.5–51.1)
MAGNESIUM SERPL-MCNC: 2.2 MG/DL — SIGNIFICANT CHANGE UP (ref 1.8–2.4)
MCHC RBC-ENTMCNC: 29.7 PG — SIGNIFICANT CHANGE UP (ref 27–31)
MCHC RBC-ENTMCNC: 32.8 G/DL — SIGNIFICANT CHANGE UP (ref 32–37)
MCV RBC AUTO: 90.3 FL — SIGNIFICANT CHANGE UP (ref 80–94)
MONOCYTES # BLD AUTO: 0.84 K/UL — HIGH (ref 0.1–0.6)
MONOCYTES NFR BLD AUTO: 12 % — HIGH (ref 1.7–9.3)
MRSA PCR RESULT.: NEGATIVE — SIGNIFICANT CHANGE UP
NEUTROPHILS # BLD AUTO: 5.16 K/UL — SIGNIFICANT CHANGE UP (ref 1.4–6.5)
NEUTROPHILS NFR BLD AUTO: 73.6 % — SIGNIFICANT CHANGE UP (ref 42.2–75.2)
NRBC # BLD: 0 /100 WBCS — SIGNIFICANT CHANGE UP (ref 0–0)
PLATELET # BLD AUTO: 278 K/UL — SIGNIFICANT CHANGE UP (ref 130–400)
POTASSIUM SERPL-MCNC: 5.3 MMOL/L — HIGH (ref 3.5–5)
POTASSIUM SERPL-SCNC: 5.3 MMOL/L — HIGH (ref 3.5–5)
PROT SERPL-MCNC: 6.7 G/DL — SIGNIFICANT CHANGE UP (ref 6–8)
RBC # BLD: 4.35 M/UL — LOW (ref 4.7–6.1)
RBC # FLD: 11.9 % — SIGNIFICANT CHANGE UP (ref 11.5–14.5)
SODIUM SERPL-SCNC: 140 MMOL/L — SIGNIFICANT CHANGE UP (ref 135–146)
WBC # BLD: 7.01 K/UL — SIGNIFICANT CHANGE UP (ref 4.8–10.8)
WBC # FLD AUTO: 7.01 K/UL — SIGNIFICANT CHANGE UP (ref 4.8–10.8)

## 2021-07-21 PROCEDURE — 99233 SBSQ HOSP IP/OBS HIGH 50: CPT

## 2021-07-21 PROCEDURE — 73701 CT LOWER EXTREMITY W/DYE: CPT | Mod: 26,RT

## 2021-07-21 RX ORDER — AMLODIPINE BESYLATE 2.5 MG/1
5 TABLET ORAL DAILY
Refills: 0 | Status: DISCONTINUED | OUTPATIENT
Start: 2021-07-22 | End: 2021-07-26

## 2021-07-21 RX ORDER — SODIUM ZIRCONIUM CYCLOSILICATE 10 G/10G
10 POWDER, FOR SUSPENSION ORAL ONCE
Refills: 0 | Status: COMPLETED | OUTPATIENT
Start: 2021-07-21 | End: 2021-07-21

## 2021-07-21 RX ORDER — VANCOMYCIN HCL 1 G
1250 VIAL (EA) INTRAVENOUS EVERY 12 HOURS
Refills: 0 | Status: DISCONTINUED | OUTPATIENT
Start: 2021-07-21 | End: 2021-07-22

## 2021-07-21 RX ORDER — VANCOMYCIN HCL 1 G
1250 VIAL (EA) INTRAVENOUS ONCE
Refills: 0 | Status: COMPLETED | OUTPATIENT
Start: 2021-07-21 | End: 2021-07-21

## 2021-07-21 RX ORDER — VANCOMYCIN HCL 1 G
VIAL (EA) INTRAVENOUS
Refills: 0 | Status: DISCONTINUED | OUTPATIENT
Start: 2021-07-21 | End: 2021-07-22

## 2021-07-21 RX ADMIN — Medication 166.67 MILLIGRAM(S): at 18:48

## 2021-07-21 RX ADMIN — Medication 100 MILLIGRAM(S): at 17:34

## 2021-07-21 RX ADMIN — Medication 400 MILLIGRAM(S): at 05:32

## 2021-07-21 RX ADMIN — LOSARTAN POTASSIUM 50 MILLIGRAM(S): 100 TABLET, FILM COATED ORAL at 05:32

## 2021-07-21 RX ADMIN — ENOXAPARIN SODIUM 40 MILLIGRAM(S): 100 INJECTION SUBCUTANEOUS at 11:18

## 2021-07-21 RX ADMIN — SODIUM ZIRCONIUM CYCLOSILICATE 10 GRAM(S): 10 POWDER, FOR SUSPENSION ORAL at 10:38

## 2021-07-21 RX ADMIN — OXYCODONE AND ACETAMINOPHEN 1 TABLET(S): 5; 325 TABLET ORAL at 01:00

## 2021-07-21 RX ADMIN — Medication 400 MILLIGRAM(S): at 12:30

## 2021-07-21 RX ADMIN — Medication 400 MILLIGRAM(S): at 18:31

## 2021-07-21 RX ADMIN — Medication 166.67 MILLIGRAM(S): at 10:28

## 2021-07-21 RX ADMIN — Medication 400 MILLIGRAM(S): at 01:00

## 2021-07-21 RX ADMIN — Medication 100 MILLIGRAM(S): at 05:25

## 2021-07-21 RX ADMIN — PANTOPRAZOLE SODIUM 40 MILLIGRAM(S): 20 TABLET, DELAYED RELEASE ORAL at 06:11

## 2021-07-21 RX ADMIN — Medication 400 MILLIGRAM(S): at 17:35

## 2021-07-21 RX ADMIN — Medication 400 MILLIGRAM(S): at 11:18

## 2021-07-21 RX ADMIN — Medication 400 MILLIGRAM(S): at 07:02

## 2021-07-21 NOTE — PROGRESS NOTE ADULT - ASSESSMENT
ADMISSION SUMMARY  51 YO M with a PMH of HTN, GERD, hx of R foot drop, prior episodes of MRSA cellulitis and hx of RLL DVT (completed Apixaban course) who presents to the hospital with a c/o worsening right ankle redness over the past x 4 days. Associated with swelling/pain and fevers. Denies any trauma/falls. In the ED, XRs of RLE were negative for acute process. Dopplers of RLEs are negative. Cultures obtained, pt was started on IV ABXs.       #Cellulitis  - hx of R. foot drop and decreased sensation in R. foot.   - hx of MRSA cellulitis  - Erythema and edema of R. lateral and medial ankle with pain on active ROM - increasing since yesterday  - pt. denies paresthesias with passive ROM  - foot x-ray no evidence for fracture or dislocation  - pt. afebrile since 00:35 7/20  - cont. Clindamycin IV q12  - start vancomycin  - f/u blood cultures    #HTN  - cont. losartan 50mg po daily    #GERD  - cont. protonix 40mg po daily      Dvt ppx: lovenox 40mg  PPI ppx: protonix 40mg  Diet: DASH  Dispo:  Home, possibly in 24h if pt's symptoms continue to improve and is afebrile for 24h  Activity: as tolerated   ADMISSION SUMMARY  49 YO M with a PMH of HTN, GERD, hx of R foot drop, prior episodes of MRSA cellulitis and hx of RLL DVT (completed Apixaban course) who presents to the hospital with a c/o worsening right ankle redness over the past x 4 days. Associated with swelling/pain and fevers. Denies any trauma/falls. In the ED, XRs of RLE were negative for acute process. Dopplers of RLEs are negative. Cultures obtained, pt was started on IV ABXs.       #Cellulitis  - hx of R. foot drop and decreased sensation in R. foot.   - hx of MRSA cellulitis  - Erythema and edema of R. lateral and medial ankle with pain on active ROM - increasing since yesterday  - pt. denies paresthesias with passive ROM  - foot x-ray no evidence for fracture or dislocation  - pt. afebrile since 00:35 7/20  - cont. Clindamycin IV q12  - start vancomycin 1250 mg q12  - d/c percocet  - give motrin 400mg q6  - f/u CT ankle  - f/u blood cultures    #HTN  - Start amlodipine 5mg PO daily  - d/c losartan due to hyperkalemia  - will need follow-up outpatient for BP management     #Hyperkalemia  - give lokelma 10g once  - f/u K  - d/c losartan - avoid ACEi/ARBs    #transaminitis  - pt. denies abdominal pain  - trend CMP  - t.bili wnl    #GERD  - cont. protonix 40mg po daily      Dvt ppx: lovenox 40mg  PPI ppx: protonix 40mg  Diet: DASH  Dispo:  Home, possibly in 24h if pt's symptoms continue to improve and is afebrile for 24h  Activity: as tolerated

## 2021-07-21 NOTE — PROGRESS NOTE ADULT - ASSESSMENT
49 YO M with a PMH of HTN, GERD, hx of R foot drop, prior episodes of MRSA cellulitis and hx of RLL DVT (completed Apixaban course) who presents to the hospital with a c/o worsening right ankle redness over the past x 4 days. Associated with swelling/pain and fevers. Denies any trauma/falls. In the ED, XRs of RLE were negative for acute process. Dopplers of RLEs are negative. Cultures obtained, pt was started on IV ABXs.       #Cellulitis  - hx of R. foot drop and decreased sensation in R. foot. (baseline)  - hx of MRSA cellulitis  - Erythema and edema of R. lateral and medial ankle with pain on active ROM - increasing since yesterday  - pt. denies paresthesias with passive ROM  - foot x-ray no evidence for fracture or dislocation  - pt. afebrile since 00:35 7/20  - cont. Clindamycin IV q12  - start vancomycin 1250 mg q12  - d/c percocet  - give motrin 400mg q6  - f/u CT ankle  - f/u blood cultures    #HTN  - Start amlodipine 5mg PO daily  - d/c losartan due to hyperkalemia  - will need follow-up outpatient for BP management     #Hyperkalemia  - give lokelma 10g once  - f/u K  - d/c losartan - avoid ACEi/ARBs    #transaminitis  - pt. denies abdominal pain  - trend CMP  - t.bili wnl    #GERD  - cont. protonix 40mg po daily      Dvt ppx: lovenox 40mg  PPI ppx: protonix 40mg  Diet: DASH  Dispo:  Home, possibly in 24h if pt's symptoms continue to improve and is afebrile for 24h  Activity: as tolerated     49 YO M with a PMH of HTN, GERD, hx of R foot drop, prior episodes of MRSA cellulitis and hx of RLL DVT (completed Apixaban course) who presents to the hospital with a c/o worsening right ankle redness over the past x 4 days. Associated with swelling/pain and fevers. Denies any trauma/falls. In the ED, XRs of RLE were negative for acute process. Dopplers of RLEs are negative. Cultures obtained, pt was started on IV ABXs.       #Cellulitis  - hx of R. foot drop and decreased sensation in R. foot. (baseline)  - hx of MRSA cellulitis  - Erythema and edema of R. lateral and medial ankle with pain on active ROM - increasing since yesterday  - pt. denies paresthesias with passive ROM  - foot x-ray no evidence for fracture or dislocation  - pt. afebrile since 00:35 7/20  - cont. Clindamycin IV q12  - start vancomycin 1250 mg q12  - give motrin 400mg q6  - f/u CT ankle  - f/u blood cultures    #HTN  - Start amlodipine 5mg PO daily  - d/c losartan due to hyperkalemia  - will need follow-up outpatient for BP management     #Hyperkalemia  - give lokelma 10g once  - f/u K  - d/c losartan - avoid ACEi/ARBs    #transaminitis  - pt. denies abdominal pain  - trend CMP  - t.bili wnl    #GERD  - cont. protonix 40mg po daily      Dvt ppx: lovenox 40mg  PPI ppx: protonix 40mg  Diet: DASH  Activity: as tolerated    #Progress Note Handoff  Pending (specify): CT of ankle, ID consult, clinical improvement  Family discussion:  Disposition: Home___/SNF___/Other________/Unknown at this time________

## 2021-07-21 NOTE — PHARMACOTHERAPY INTERVENTION NOTE - COMMENTS
Patient [place]-Liner  51 YO M with a PMH of HTN, GERD, hx of R foot drop, and hx of RLL DVT (completed Apixaban course) who presents to the hospital with a c/o worsening right ankle redness over the past x 4 days.  Currently being treated for cellulitis    Allergies: No Known Allergies      Vancomycin Day #1  Indication: Cellulitis  Goal trough: 10-15 mcg/mL  ID: No  Other antibiotics:  clindamycin IVPB 600 milliGRAM(s) IV Intermittent every 8 hours    Labs: --   CrCl (mL/minute) = 103    BUN (mg/dL) =   Blood Urea Nitrogen, Serum: 16 mg/dL (07-21-21 @ 06:47)  Blood Urea Nitrogen, Serum: 16 mg/dL (07-20-21 @ 06:05)  Blood Urea Nitrogen, Serum: 13 mg/dL (07-19-21 @ 12:10)      SCr (mg/dL) =   Creatinine, Serum: 0.9 mg/dL (07-21-21 @ 06:47)  Creatinine, Serum: 0.8 mg/dL (07-20-21 @ 06:05)  Creatinine, Serum: 0.9 mg/dL (07-19-21 @ 12:10)      WBC =   WBC Count: 7.01 K/uL (07-21-21 @ 06:47)  WBC Count: 7.85 K/uL (07-20-21 @ 06:05)  WBC Count: 10.25 K/uL (07-19-21 @ 12:10)      ESR =  Sedimentation Rate, Erythrocyte: 2 mm/Hr (07-19-21 @ 12:10)    Tmax (ºF) =   T(C): 36.4 (07-21-21 @ 05:18), Max: 38.3 (07-20-21 @ 00:35)  T(F): 97.6 (07-21-21 @ 05:18), Max: 101 (07-20-21 @ 00:35)      Micro:   MRSA PCR = Not obtained    Culture: Pending    PK:  Dosing weight (kg) = 90.5  Ke = 0.10  t ½ = 7-11  Vd = 63.35    Current Regimen: 1250 mg q12 (14 mg/kg)  Estimated peak: 22  Estimated trough: 9    A/P:    VANC DOSE/SCHEDULE/TROUGH  Date          Time              Dose  07/21----->10:28--------->1250 mg    		  		  Recommendations:  1. Obtain ID consult  2. Trough 30 mins prior to 4th dose (scheduled 7/22 at 22:00)  3. MRSA Nares  4. Blood/Wound cultures        		  		  		  Please call [facility] [phone] for any questions

## 2021-07-21 NOTE — PROGRESS NOTE ADULT - SUBJECTIVE AND OBJECTIVE BOX
EFRAÍN BRADFORD  50y  Male      Patient is a 50y old  Male who presents with a chief complaint of Right ankle edema, pain, erythema (21 Jul 2021 13:07)      INTERVAL HPI/OVERNIGHT EVENTS: no acute events overnight. still unable to bear weight. area of erythema worsened.      REVIEW OF SYSTEMS:  CONSTITUTIONAL: No fever, weight loss, or fatigue  RESPIRATORY: No cough, wheezing, chills or hemoptysis; No shortness of breath  CARDIOVASCULAR: No chest pain, palpitations, dizziness, or leg swelling  GASTROINTESTINAL: No abdominal or epigastric pain. No nausea, vomiting, or hematemesis; No diarrhea or constipation. No melena or hematochezia.  GENITOURINARY: No dysuria, frequency, hematuria, or incontinence  NEUROLOGICAL: No headaches, memory loss, loss of strength, numbness, or tremors  SKIN: No itching, burning, rashes, or lesions   MUSCULOSKELETAL: No joint pain or swelling; No muscle, back, or extremity pain  PSYCHIATRIC: No depression, anxiety, mood swings, or difficulty sleeping  All other review of systems negative    VITALS  T(C): 36.2 (07-21-21 @ 13:32), Max: 36.7 (07-20-21 @ 13:56)  HR: 86 (07-21-21 @ 13:32) (72 - 105)  BP: 131/73 (07-21-21 @ 13:32) (109/67 - 131/75)  RR: 18 (07-21-21 @ 13:32) (18 - 20)  SpO2: 97% (07-20-21 @ 21:35) (95% - 97%)  Wt(kg): --Vital Signs Last 24 Hrs  T(C): 36.2 (21 Jul 2021 13:32), Max: 36.7 (20 Jul 2021 13:56)  T(F): 97.1 (21 Jul 2021 13:32), Max: 98.1 (20 Jul 2021 13:56)  HR: 86 (21 Jul 2021 13:32) (72 - 105)  BP: 131/73 (21 Jul 2021 13:32) (109/67 - 131/75)  BP(mean): --  RR: 18 (21 Jul 2021 13:32) (18 - 20)  SpO2: 97% (20 Jul 2021 21:35) (95% - 97%)      07-20-21 @ 07:01  -  07-21-21 @ 07:00  --------------------------------------------------------  IN: 0 mL / OUT: 900 mL / NET: -900 mL        PHYSICAL EXAM:  GENERAL: NAD, well-groomed, well-developed  EYES: anicteric sclera, non injected conjunctiva, EOMI  ENT: hearing grossly intact, oropharyx clear, MMM  PSYCH: no agitation, baseline mentation  NERVOUS SYSTEM:  Alert & Oriented X3, Motor Strength 5/5 B/L upper and lower extremities;  PULMONARY: Clear to percussion bilaterally; No rales, rhonchi, wheezing, or rubs  CARDIOVASCULAR: Regular rate and rhythm; No murmurs, rubs, or gallops  GI: Soft, Nontender, Nondistended; Bowel sounds present  EXTREMITIES:  2+ Peripheral Pulses, No clubbing, cyanosis; faintly erythematous, poorly circumscribed area that is warm to touch on right ankle  LYMPH: No lymphadenopathy noted  SKIN: No rashes or lesions    Consultant(s) Notes Reviewed:  [x ] YES  [ ] NO    Discussed with Consultants/Other Providers [ x] YES     LABS                          12.9   7.01  )-----------( 278      ( 21 Jul 2021 06:47 )             39.3     07-21    140  |  103  |  16  ----------------------------<  97  5.3<H>   |  29  |  0.9    Ca    9.1      21 Jul 2021 06:47  Mg     2.2     07-21    TPro  6.7  /  Alb  4.0  /  TBili  0.5  /  DBili  x   /  AST  57<H>  /  ALT  101<H>  /  AlkPhos  222<H>  07-21      RADIOLOGY & ADDITIONAL TESTS:  < from: Xray Foot AP + Lateral + Oblique, Right (07.19.21 @ 13:46) >    Impression:  No acute fracture or dislocation.    < end of copied text >      MEDICATIONS  (STANDING):  clindamycin IVPB 600 milliGRAM(s) IV Intermittent every 8 hours  enoxaparin Injectable 40 milliGRAM(s) SubCutaneous daily  ibuprofen  Tablet. 400 milliGRAM(s) Oral every 6 hours  pantoprazole    Tablet 40 milliGRAM(s) Oral before breakfast  vancomycin  IVPB      vancomycin  IVPB 1250 milliGRAM(s) IV Intermittent every 12 hours    MEDICATIONS  (PRN):

## 2021-07-22 LAB
ALBUMIN SERPL ELPH-MCNC: 3.9 G/DL — SIGNIFICANT CHANGE UP (ref 3.5–5.2)
ALP SERPL-CCNC: 227 U/L — HIGH (ref 30–115)
ALT FLD-CCNC: 89 U/L — HIGH (ref 0–41)
ANION GAP SERPL CALC-SCNC: 11 MMOL/L — SIGNIFICANT CHANGE UP (ref 7–14)
AST SERPL-CCNC: 42 U/L — HIGH (ref 0–41)
B PERT IGG+IGM PNL SER: ABNORMAL
BASOPHILS # BLD AUTO: 0.03 K/UL — SIGNIFICANT CHANGE UP (ref 0–0.2)
BASOPHILS NFR BLD AUTO: 0.5 % — SIGNIFICANT CHANGE UP (ref 0–1)
BILIRUB SERPL-MCNC: 0.3 MG/DL — SIGNIFICANT CHANGE UP (ref 0.2–1.2)
BUN SERPL-MCNC: 16 MG/DL — SIGNIFICANT CHANGE UP (ref 10–20)
CALCIUM SERPL-MCNC: 9.1 MG/DL — SIGNIFICANT CHANGE UP (ref 8.5–10.1)
CHLORIDE SERPL-SCNC: 103 MMOL/L — SIGNIFICANT CHANGE UP (ref 98–110)
CO2 SERPL-SCNC: 26 MMOL/L — SIGNIFICANT CHANGE UP (ref 17–32)
COLOR FLD: SIGNIFICANT CHANGE UP
CREAT SERPL-MCNC: 0.9 MG/DL — SIGNIFICANT CHANGE UP (ref 0.7–1.5)
EOSINOPHIL # BLD AUTO: 0.19 K/UL — SIGNIFICANT CHANGE UP (ref 0–0.7)
EOSINOPHIL NFR BLD AUTO: 3 % — SIGNIFICANT CHANGE UP (ref 0–8)
FLUID INTAKE SUBSTANCE CLASS: SIGNIFICANT CHANGE UP
FLUID SEGMENTED GRANULOCYTES: 81 % — SIGNIFICANT CHANGE UP
GLUCOSE SERPL-MCNC: 102 MG/DL — HIGH (ref 70–99)
GRAM STN FLD: SIGNIFICANT CHANGE UP
HCT VFR BLD CALC: 38.5 % — LOW (ref 42–52)
HGB BLD-MCNC: 12.6 G/DL — LOW (ref 14–18)
IMM GRANULOCYTES NFR BLD AUTO: 0.6 % — HIGH (ref 0.1–0.3)
LYMPHOCYTES # BLD AUTO: 1.46 K/UL — SIGNIFICANT CHANGE UP (ref 1.2–3.4)
LYMPHOCYTES # BLD AUTO: 22.9 % — SIGNIFICANT CHANGE UP (ref 20.5–51.1)
LYMPHOCYTES # FLD: 12 — SIGNIFICANT CHANGE UP
MAGNESIUM SERPL-MCNC: 2.2 MG/DL — SIGNIFICANT CHANGE UP (ref 1.8–2.4)
MCHC RBC-ENTMCNC: 29.9 PG — SIGNIFICANT CHANGE UP (ref 27–31)
MCHC RBC-ENTMCNC: 32.7 G/DL — SIGNIFICANT CHANGE UP (ref 32–37)
MCV RBC AUTO: 91.2 FL — SIGNIFICANT CHANGE UP (ref 80–94)
MONOCYTES # BLD AUTO: 0.66 K/UL — HIGH (ref 0.1–0.6)
MONOCYTES NFR BLD AUTO: 10.3 % — HIGH (ref 1.7–9.3)
MONOS+MACROS # FLD: 7 % — SIGNIFICANT CHANGE UP
NEUTROPHILS # BLD AUTO: 4 K/UL — SIGNIFICANT CHANGE UP (ref 1.4–6.5)
NEUTROPHILS NFR BLD AUTO: 62.7 % — SIGNIFICANT CHANGE UP (ref 42.2–75.2)
NRBC # BLD: 0 /100 WBCS — SIGNIFICANT CHANGE UP (ref 0–0)
PLATELET # BLD AUTO: 309 K/UL — SIGNIFICANT CHANGE UP (ref 130–400)
POTASSIUM SERPL-MCNC: 4.4 MMOL/L — SIGNIFICANT CHANGE UP (ref 3.5–5)
POTASSIUM SERPL-SCNC: 4.4 MMOL/L — SIGNIFICANT CHANGE UP (ref 3.5–5)
PROT SERPL-MCNC: 6.6 G/DL — SIGNIFICANT CHANGE UP (ref 6–8)
RBC # BLD: 4.22 M/UL — LOW (ref 4.7–6.1)
RBC # FLD: 12 % — SIGNIFICANT CHANGE UP (ref 11.5–14.5)
RCV VOL RI: HIGH /UL (ref 0–0)
SODIUM SERPL-SCNC: 140 MMOL/L — SIGNIFICANT CHANGE UP (ref 135–146)
TOTAL NUCLEATED CELL COUNT, BODY FLUID: SIGNIFICANT CHANGE UP /UL
TUBE TYPE: SIGNIFICANT CHANGE UP
WBC # BLD: 6.38 K/UL — SIGNIFICANT CHANGE UP (ref 4.8–10.8)
WBC # FLD AUTO: 6.38 K/UL — SIGNIFICANT CHANGE UP (ref 4.8–10.8)

## 2021-07-22 PROCEDURE — ZZZZZ: CPT

## 2021-07-22 PROCEDURE — 99233 SBSQ HOSP IP/OBS HIGH 50: CPT

## 2021-07-22 RX ORDER — CEFTRIAXONE 500 MG/1
2000 INJECTION, POWDER, FOR SOLUTION INTRAMUSCULAR; INTRAVENOUS EVERY 24 HOURS
Refills: 0 | Status: DISCONTINUED | OUTPATIENT
Start: 2021-07-23 | End: 2021-07-26

## 2021-07-22 RX ORDER — DIPHENHYDRAMINE HCL 50 MG
25 CAPSULE ORAL EVERY 4 HOURS
Refills: 0 | Status: DISCONTINUED | OUTPATIENT
Start: 2021-07-22 | End: 2021-07-26

## 2021-07-22 RX ORDER — CEFTRIAXONE 500 MG/1
2000 INJECTION, POWDER, FOR SOLUTION INTRAMUSCULAR; INTRAVENOUS ONCE
Refills: 0 | Status: COMPLETED | OUTPATIENT
Start: 2021-07-22 | End: 2021-07-22

## 2021-07-22 RX ORDER — CEFTRIAXONE 500 MG/1
INJECTION, POWDER, FOR SOLUTION INTRAMUSCULAR; INTRAVENOUS
Refills: 0 | Status: DISCONTINUED | OUTPATIENT
Start: 2021-07-22 | End: 2021-07-26

## 2021-07-22 RX ADMIN — Medication 25 MILLIGRAM(S): at 23:40

## 2021-07-22 RX ADMIN — Medication 400 MILLIGRAM(S): at 23:38

## 2021-07-22 RX ADMIN — AMLODIPINE BESYLATE 5 MILLIGRAM(S): 2.5 TABLET ORAL at 05:22

## 2021-07-22 RX ADMIN — ENOXAPARIN SODIUM 40 MILLIGRAM(S): 100 INJECTION SUBCUTANEOUS at 10:50

## 2021-07-22 RX ADMIN — PANTOPRAZOLE SODIUM 40 MILLIGRAM(S): 20 TABLET, DELAYED RELEASE ORAL at 06:30

## 2021-07-22 RX ADMIN — Medication 166.67 MILLIGRAM(S): at 05:21

## 2021-07-22 RX ADMIN — Medication 100 MILLIGRAM(S): at 00:50

## 2021-07-22 RX ADMIN — Medication 100 MILLIGRAM(S): at 05:21

## 2021-07-22 RX ADMIN — Medication 25 MILLIGRAM(S): at 16:00

## 2021-07-22 RX ADMIN — CEFTRIAXONE 100 MILLIGRAM(S): 500 INJECTION, POWDER, FOR SOLUTION INTRAMUSCULAR; INTRAVENOUS at 09:48

## 2021-07-22 RX ADMIN — Medication 400 MILLIGRAM(S): at 01:30

## 2021-07-22 RX ADMIN — Medication 400 MILLIGRAM(S): at 05:22

## 2021-07-22 RX ADMIN — Medication 400 MILLIGRAM(S): at 10:49

## 2021-07-22 RX ADMIN — Medication 400 MILLIGRAM(S): at 18:34

## 2021-07-22 RX ADMIN — Medication 400 MILLIGRAM(S): at 00:53

## 2021-07-22 NOTE — CONSULT NOTE ADULT - SUBJECTIVE AND OBJECTIVE BOX
Orthopaedic Surgery Consult Note    For Surgeon:  Time Called: 1500  Time seen: 1515    HPI:  50yMale with a pmh of right foot drop,  admitted to the hospital x3 days ago for right ankle cellulitis. Patient seen and examined at bedside. Patient states that his pain began x1 week ago which got progressively  worse over the next few days which made ambulating harder which prompted him to come to the hospital. Patient states the pain began after sitting on the right ankle in a crossed leg postion for ~1 hour.  In the ER he was febrile to 101. After being in the hospital for 3 days with IV abx treatment the erythmea, and swelling of the right ankle was still present as well as moderate joint effusion seen on CT which prompted the medical team to consult orthopedics.  Patient has been afebrile since admission. He also admits to relief of pain after first dose of abx. He has not been walking on the RLE since admission. Patient denies any history of gout, admits to eating red meat and consuming alcohol often. No family hx of gout.     HPI:  50 year old male patient presented for right ankle dolor, calor, rubor, diagnosed with cellulitis, admitted for management.     Known hypertensive, GERD, history of right foot drop since 2019, history of RLL DVT in Jan 2021 received full course of apixaban and discontinued it.     Current history goes back to 4 days prior to presentation when the patient began to note gradual progression of erythema, followed by edema and then severe pain limiting range of motion. Patient admits fever of 101.4 on 2 days prior to presentation as well. 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 No paresthesias, focal weakness, or other associated complaints at present.   He is admitted for management. (19 Jul 2021 15:54)      Allergies    No Known Allergies    Intolerances      PAST MEDICAL & SURGICAL HISTORY:  HTN (hypertension)    Acid reflux      MEDICATIONS  (STANDING):  amLODIPine   Tablet 5 milliGRAM(s) Oral daily  cefTRIAXone   IVPB      enoxaparin Injectable 40 milliGRAM(s) SubCutaneous daily  ibuprofen  Tablet. 400 milliGRAM(s) Oral every 6 hours  pantoprazole    Tablet 40 milliGRAM(s) Oral before breakfast    MEDICATIONS  (PRN):  diphenhydrAMINE 25 milliGRAM(s) Oral every 4 hours PRN Rash and/or Itching      Vital Signs Last 24 Hrs  T(C): 35.9 (22 Jul 2021 13:39), Max: 37.2 (21 Jul 2021 21:10)  T(F): 96.6 (22 Jul 2021 13:39), Max: 98.9 (21 Jul 2021 21:10)  HR: 86 (22 Jul 2021 13:39) (74 - 88)  BP: 128/77 (22 Jul 2021 13:39) (101/59 - 131/74)  BP(mean): --  RR: 18 (22 Jul 2021 13:39) (18 - 18)  SpO2: --    Physical Exam:  Patient laying in bed in NAD, partner bedside, unlabored breathing on RA     Right ankle:  +generalized swelling   2+ pitting edema   medial and lateral patches of erythematous skin changes  +ttp posterior heel and at anterior tibialis   no pain during active and passive ROM   +pain during weight bearing at bedside  strength 3/5; patient states this is his baseline due to foot drop   sensation intact                           12.6   6.38  )-----------( 309      ( 22 Jul 2021 05:55 )             38.5     07-22    140  |  103  |  16  ----------------------------<  102<H>  4.4   |  26  |  0.9    Ca    9.1      22 Jul 2021 05:55  Mg     2.2     07-22    TPro  6.6  /  Alb  3.9  /  TBili  0.3  /  DBili  x   /  AST  42<H>  /  ALT  89<H>  /  AlkPhos  227<H>  07-22      Imaging:   < from: CT Ankle w/ IV Cont, Right (07.21.21 @ 23:26) >    IMPRESSION:    Bimalleolar and diffuse foot subcutaneous edema, without abscess.    No acute osseous abnormality.    Large ankle joint effusion with extension into the flexor hallucis longus tendon sheath, and moderate posterior subtalar joint effusion.    < end of copied text >    A/P: 50yMale with right ankle pain, swelling, patches of erythematous skin changes, ortho consulted for rule out right ankle septic joint   -right ankle aspiration done at bedside with patients verbal consent; see procedure note   -8cc aspirated; bloody/turbid fluid aspirated  -fluid sent for cell count, gram stain, culture, and crystals   -keep patient NPO for possible OR for I&D pending lab results   -ortho following        Orthopaedic Surgery Consult Note    For Surgeon:  Time Called: 1500  Time seen: 1515    HPI:  50yMale with a pmh of right foot drop,  admitted to the hospital x3 days ago for right ankle cellulitis. Patient seen and examined at bedside. Patient states that his pain began x1 week ago which got progressively  worse over the next few days which made ambulating harder which prompted him to come to the hospital. Patient states the pain began after sitting on the right ankle in a crossed leg postion for ~1 hour.  In the ER he was febrile to 101. After being in the hospital for 3 days with IV abx treatment the erythmea, and swelling of the right ankle was still present as well as moderate joint effusion seen on CT which prompted the medical team to consult orthopedics.  Patient has been afebrile since admission. He also admits to relief of pain after first dose of abx. He has not been walking on the RLE since admission. Patient denies any history of gout, admits to eating red meat and consuming alcohol often. No family hx of gout.     HPI:  50 year old male patient presented for right ankle dolor, calor, rubor, diagnosed with cellulitis, admitted for management.     Known hypertensive, GERD, history of right foot drop since 2019, history of RLL DVT in Jan 2021 received full course of apixaban and discontinued it.     Current history goes back to 4 days prior to presentation when the patient began to note gradual progression of erythema, followed by edema and then severe pain limiting range of motion. Patient admits fever of 101.4 on 2 days prior to presentation as well. 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 No paresthesias, focal weakness, or other associated complaints at present.   He is admitted for management. (19 Jul 2021 15:54)      Allergies    No Known Allergies    Intolerances      PAST MEDICAL & SURGICAL HISTORY:  HTN (hypertension)    Acid reflux      MEDICATIONS  (STANDING):  amLODIPine   Tablet 5 milliGRAM(s) Oral daily  cefTRIAXone   IVPB      enoxaparin Injectable 40 milliGRAM(s) SubCutaneous daily  ibuprofen  Tablet. 400 milliGRAM(s) Oral every 6 hours  pantoprazole    Tablet 40 milliGRAM(s) Oral before breakfast    MEDICATIONS  (PRN):  diphenhydrAMINE 25 milliGRAM(s) Oral every 4 hours PRN Rash and/or Itching      Vital Signs Last 24 Hrs  T(C): 35.9 (22 Jul 2021 13:39), Max: 37.2 (21 Jul 2021 21:10)  T(F): 96.6 (22 Jul 2021 13:39), Max: 98.9 (21 Jul 2021 21:10)  HR: 86 (22 Jul 2021 13:39) (74 - 88)  BP: 128/77 (22 Jul 2021 13:39) (101/59 - 131/74)  BP(mean): --  RR: 18 (22 Jul 2021 13:39) (18 - 18)  SpO2: --    Physical Exam:  Patient laying in bed in NAD, partner bedside, unlabored breathing on RA     Right ankle:  +generalized swelling   2+ pitting edema   medial and lateral patches of erythematous skin changes  +ttp posterior heel and at anterior tibialis   no pain during active and passive ROM   +pain during weight bearing at bedside  strength 3/5; patient states this is his baseline due to foot drop   sensation intact                           12.6   6.38  )-----------( 309      ( 22 Jul 2021 05:55 )             38.5     07-22    140  |  103  |  16  ----------------------------<  102<H>  4.4   |  26  |  0.9    Ca    9.1      22 Jul 2021 05:55  Mg     2.2     07-22    TPro  6.6  /  Alb  3.9  /  TBili  0.3  /  DBili  x   /  AST  42<H>  /  ALT  89<H>  /  AlkPhos  227<H>  07-22      Imaging:   < from: CT Ankle w/ IV Cont, Right (07.21.21 @ 23:26) >    IMPRESSION:    Bimalleolar and diffuse foot subcutaneous edema, without abscess.    No acute osseous abnormality.    Large ankle joint effusion with extension into the flexor hallucis longus tendon sheath, and moderate posterior subtalar joint effusion.    < end of copied text >    A/P: 50yMale with right ankle pain, swelling, patches of erythematous skin changes, ortho consulted for rule out right ankle septic joint   -right ankle aspiration done at bedside with patients verbal consent; see procedure note   -8cc aspirated; bloody/turbid fluid aspirated  -fluid sent for cell count, gram stain, culture, and crystals   -keep patient NPO for possible OR for I&D pending lab results   -ortho following   feeling better awaiting final cultures

## 2021-07-22 NOTE — PROGRESS NOTE ADULT - SUBJECTIVE AND OBJECTIVE BOX
EFRAÍN BRADFORD 50y Male  MRN#: [id number]   CODE STATUS: FULL      SUBJECTIVE  Patient is a 50y old Male who presents with a chief complaint of Right ankle edema, pain, erythema (21 Jul 2021 13:39)  Currently admitted to medicine with the primary diagnosis of Cellulitis    Today is hospital day 3d, and this morning he is resting in bed with mild ankle pain and reports no overnight events.     Present Today:           [place]jas Catheter (x)No/ ()Yes? Indication:          Central Line (x)No/ ()Yes? Indication:          IV Fluids (x)No/ ()Yes? Type:  Rate:  Indication:      OBJECTIVE  PAST MEDICAL & SURGICAL HISTORY  HTN (hypertension)    Acid reflux      Home Meds:  Benicar 20 mg oral tablet: 1 tab(s) orally once a day  NexIUM 40 mg oral delayed release capsule: 1 cap(s) orally once a day    ALLERGIES:  No Known Allergies    MEDICATIONS:  STANDING MEDICATIONS  amLODIPine   Tablet 5 milliGRAM(s) Oral daily  cefTRIAXone   IVPB      cefTRIAXone   IVPB 2000 milliGRAM(s) IV Intermittent once  enoxaparin Injectable 40 milliGRAM(s) SubCutaneous daily  ibuprofen  Tablet. 400 milliGRAM(s) Oral every 6 hours  pantoprazole    Tablet 40 milliGRAM(s) Oral before breakfast    PRN MEDICATIONS      VITAL SIGNS: Last 24 Hours  T(C): 35.9 (22 Jul 2021 06:09), Max: 37.2 (21 Jul 2021 21:10)  T(F): 96.6 (22 Jul 2021 06:09), Max: 98.9 (21 Jul 2021 21:10)  HR: 74 (22 Jul 2021 06:09) (74 - 88)  BP: 131/74 (22 Jul 2021 06:09) (101/59 - 131/74)  BP(mean): --  RR: 18 (22 Jul 2021 06:09) (18 - 18)  SpO2: --    LABS:                        12.6   6.38  )-----------( 309      ( 22 Jul 2021 05:55 )             38.5     07-22    140  |  103  |  16  ----------------------------<  102<H>  4.4   |  26  |  0.9    Ca    9.1      22 Jul 2021 05:55  Mg     2.2     07-22    TPro  6.6  /  Alb  3.9  /  TBili  0.3  /  DBili  x   /  AST  42<H>  /  ALT  89<H>  /  AlkPhos  227<H>  07-22              Culture - Blood (collected 20 Jul 2021 16:00)  Source: .Blood Blood  Preliminary Report (22 Jul 2021 02:02):    No growth to date.          RADIOLOGY:      PHYSICAL EXAM:  General: NAD, AAOx3  HEENT: atraumatic, normocephalic, EOMI, PERRLA, conjunctiva and sclera clear, No JVD  Pulmonary: clear to auscultation bilaterally; No wheeze  Cardiovascular: Regular rate and rhythm; no murmurs, rubs or gallops. Normal S1S2  Gastrointestinal: Soft, nontender, nondistended; bowel sounds present  Musculoskeletal: 2+ peripheral pulses, no clubbing, cyanosis or edema  Neurology: non-focal, Pt. alert and oriented, fluent speech, able to move all extremities  Skin: +erythema and swelling of R. lateral and medial ankle with pain on active ROM. Pt. denies paraesthesias with passive ROM. Stable erythema from yesterday. +pruritic papular rash on back.

## 2021-07-22 NOTE — PROGRESS NOTE ADULT - ASSESSMENT
51 YO M with a PMH of HTN, GERD, hx of R foot drop, prior episodes of MRSA cellulitis and hx of RLL DVT (completed Apixaban course) who presents to the hospital with a c/o worsening right ankle redness over the past x 4 days. Associated with swelling/pain and fevers. Denies any trauma/falls. In the ED, XRs of RLE were negative for acute process. Dopplers of RLEs are negative. Cultures obtained, pt was started on IV ABXs.       #Cellulitis  - hx of R. foot drop and decreased sensation in R. foot. (baseline)  - hx of MRSA cellulitis; negative nare swab on this admission  - Erythema and edema of R. lateral and medial ankle with pain on active ROM -  stable with no improvement on Clinda + Vanc  - pt. denies paresthesias with passive ROM  - foot x-ray no evidence for fracture or dislocation  - pt. afebrile since 00:35 7/20  - CT demonstrating large joint effusion but no abscess collection  - discontinued clinda and vancomycin and started Ceftriaxone 2g q24h for gram (-) coverage  - give motrin 400mg q6  - Ortho consult for joint aspiration for dx and therapeutic  - f/u blood cultures    #HTN  - Start amlodipine 5mg PO daily  - d/c losartan due to hyperkalemia  - will need follow-up outpatient for BP management     #Hyperkalemia, RESOLVED  - give lokelma 10g once  - f/u K  - d/c losartan - avoid ACEi/ARBs    #transaminitis, IMPROVING  - unsure etiology; hepatocellular injury distribution  - pt. denies abdominal pain  - trend CMP  - t.bili wnl  - will consider RUQ if worsens or new sx    #GERD  - cont. protonix 40mg po daily      Dvt ppx: lovenox 40mg  PPI ppx: protonix 40mg  Diet: DASH  Activity: as tolerated    #Progress Note Handoff  Pending (specify):  ID consult, clinical improvement, Ortho Cs  Family discussion: patient aware of plan. all questions answered. in agreement  Disposition: Unknown at this time________

## 2021-07-22 NOTE — PROGRESS NOTE ADULT - ASSESSMENT
ADMISSION SUMMARY  49 YO M with a PMH of HTN, GERD, hx of R foot drop, prior episodes of MRSA cellulitis and hx of RLL DVT (completed Apixaban course) who presents to the hospital with a c/o worsening right ankle redness over the past x 4 days. Associated with swelling/pain and fevers. Denies any trauma/falls. In the ED, XRs of RLE were negative for acute process. Dopplers of RLEs are negative. Cultures obtained, pt was started on IV ABXs.       #Cellulitis  - hx of R. foot drop and decreased sensation in R. foot.   - hx of MRSA cellulitis  - Erythema and edema of R. lateral and medial ankle with pain on active ROM - stable since yesterday  - pt. denies paresthesias with passive ROM  - foot x-ray no evidence for fracture or dislocation  - pt. afebrile since 00:35 7/20  - MRSA nares neg  - blood cultures NGTD 7/20  - cont. Clindamycin IV q12  - D/c vancomycin   - Start ceftriaxone 2 g daily  - cont. motrin 400mg q6  - f/u CT ankle  - f/u ID consult    #HTN  - cont. amlodipine 5mg PO daily  - d/c losartan due to hyperkalemia  - will need follow-up outpatient for BP management     #Hyperkalemia - resolved  - avoid ACEi/ARBs     #transaminitis  - pt. denies abdominal pain  - reports excessive use of acetaminophen prior to admission  - LFT downtrending  - trend CMP  - t.bili wnl    #GERD  - cont. protonix 40mg po daily      Dvt ppx: lovenox 40mg  PPI ppx: protonix 40mg  Diet: DASH  Dispo:  acute  Activity: as tolerated

## 2021-07-22 NOTE — PROGRESS NOTE ADULT - SUBJECTIVE AND OBJECTIVE BOX
SANCHEZEFRAÍN RODRIGUEZ  50y  Male      Patient is a 50y old  Male who presents with a chief complaint of Right ankle edema, pain, erythema (22 Jul 2021 10:37)      INTERVAL HPI/OVERNIGHT EVENTS: no acute events overnight. no chills or fevers. pain is well controlled. patient thinks subjectively the rash is "same"      REVIEW OF SYSTEMS:  CONSTITUTIONAL: No fever, weight loss, or fatigue  RESPIRATORY: No cough, wheezing, chills or hemoptysis; No shortness of breath  CARDIOVASCULAR: No chest pain, palpitations, dizziness, or leg swelling  GASTROINTESTINAL: No abdominal or epigastric pain. No nausea, vomiting, or hematemesis; No diarrhea or constipation. No melena or hematochezia.  GENITOURINARY: No dysuria, frequency, hematuria, or incontinence  NEUROLOGICAL: No headaches, memory loss, loss of strength, numbness, or tremors  SKIN: No itching, burning, rashes, or lesions   MUSCULOSKELETAL: No joint pain or swelling; No muscle, back, or extremity pain  PSYCHIATRIC: No depression, anxiety, mood swings, or difficulty sleeping  All other review of systems negative    VITALS  T(C): 35.9 (07-22-21 @ 06:09), Max: 37.2 (07-21-21 @ 21:10)  HR: 74 (07-22-21 @ 06:09) (74 - 88)  BP: 131/74 (07-22-21 @ 06:09) (101/59 - 131/74)  RR: 18 (07-22-21 @ 06:09) (18 - 18)  SpO2: --  Wt(kg): --Vital Signs Last 24 Hrs  T(C): 35.9 (22 Jul 2021 06:09), Max: 37.2 (21 Jul 2021 21:10)  T(F): 96.6 (22 Jul 2021 06:09), Max: 98.9 (21 Jul 2021 21:10)  HR: 74 (22 Jul 2021 06:09) (74 - 88)  BP: 131/74 (22 Jul 2021 06:09) (101/59 - 131/74)  BP(mean): --  RR: 18 (22 Jul 2021 06:09) (18 - 18)  SpO2: --      07-21-21 @ 07:01  -  07-22-21 @ 07:00  --------------------------------------------------------  IN: 0 mL / OUT: 800 mL / NET: -800 mL        PHYSICAL EXAM:  GENERAL: NAD, well-groomed, well-developed  EYES: anicteric sclera, non injected conjunctiva, EOMI  ENT: hearing grossly intact, oropharyx clear, MMM  PSYCH: no agitation, baseline mentation  NERVOUS SYSTEM:  Alert & Oriented X3, Motor Strength 5/5 B/L upper and lower extremities;  PULMONARY: Clear to percussion bilaterally; No rales, rhonchi, wheezing, or rubs  CARDIOVASCULAR: Regular rate and rhythm; No murmurs, rubs, or gallops  GI: Soft, Nontender, Nondistended; Bowel sounds present  EXTREMITIES:  2+ Peripheral Pulses, No clubbing, cyanosis; faintly erythematous, poorly circumscribed area that is warm to touch on right ankle-same today  LYMPH: No lymphadenopathy noted  SKIN: No rashes or lesions    Consultant(s) Notes Reviewed:  [x ] YES  [ ] NO    Discussed with Consultants/Other Providers [ x] YES     LABS                          12.6   6.38  )-----------( 309      ( 22 Jul 2021 05:55 )             38.5     07-22    140  |  103  |  16  ----------------------------<  102<H>  4.4   |  26  |  0.9    Ca    9.1      22 Jul 2021 05:55  Mg     2.2     07-22    TPro  6.6  /  Alb  3.9  /  TBili  0.3  /  DBili  x   /  AST  42<H>  /  ALT  89<H>  /  AlkPhos  227<H>  07-22      RADIOLOGY & ADDITIONAL TESTS:  < from: CT Ankle w/ IV Cont, Right (07.21.21 @ 23:26) >  IMPRESSION:    Bimalleolar and diffuse foot subcutaneous edema, without abscess.    No acute osseous abnormality.    Large ankle joint effusion with extension into the flexor hallucis longus tendon sheath, and moderate posterior subtalar joint effusion.    < end of copied text >    MEDICATIONS  (STANDING):  amLODIPine   Tablet 5 milliGRAM(s) Oral daily  cefTRIAXone   IVPB      enoxaparin Injectable 40 milliGRAM(s) SubCutaneous daily  ibuprofen  Tablet. 400 milliGRAM(s) Oral every 6 hours  pantoprazole    Tablet 40 milliGRAM(s) Oral before breakfast    MEDICATIONS  (PRN):

## 2021-07-23 LAB
ALBUMIN SERPL ELPH-MCNC: 3.9 G/DL — SIGNIFICANT CHANGE UP (ref 3.5–5.2)
ALP SERPL-CCNC: 223 U/L — HIGH (ref 30–115)
ALT FLD-CCNC: 90 U/L — HIGH (ref 0–41)
ANION GAP SERPL CALC-SCNC: 9 MMOL/L — SIGNIFICANT CHANGE UP (ref 7–14)
AST SERPL-CCNC: 43 U/L — HIGH (ref 0–41)
BASOPHILS # BLD AUTO: 0.03 K/UL — SIGNIFICANT CHANGE UP (ref 0–0.2)
BASOPHILS NFR BLD AUTO: 0.6 % — SIGNIFICANT CHANGE UP (ref 0–1)
BILIRUB SERPL-MCNC: 0.3 MG/DL — SIGNIFICANT CHANGE UP (ref 0.2–1.2)
BUN SERPL-MCNC: 18 MG/DL — SIGNIFICANT CHANGE UP (ref 10–20)
CALCIUM SERPL-MCNC: 9 MG/DL — SIGNIFICANT CHANGE UP (ref 8.5–10.1)
CHLORIDE SERPL-SCNC: 105 MMOL/L — SIGNIFICANT CHANGE UP (ref 98–110)
CO2 SERPL-SCNC: 25 MMOL/L — SIGNIFICANT CHANGE UP (ref 17–32)
CREAT SERPL-MCNC: 0.9 MG/DL — SIGNIFICANT CHANGE UP (ref 0.7–1.5)
EOSINOPHIL # BLD AUTO: 0.18 K/UL — SIGNIFICANT CHANGE UP (ref 0–0.7)
EOSINOPHIL NFR BLD AUTO: 3.3 % — SIGNIFICANT CHANGE UP (ref 0–8)
ERYTHROCYTE [SEDIMENTATION RATE] IN BLOOD: 67 MM/HR — HIGH (ref 0–10)
GLUCOSE SERPL-MCNC: 91 MG/DL — SIGNIFICANT CHANGE UP (ref 70–99)
HCT VFR BLD CALC: 39.9 % — LOW (ref 42–52)
HGB BLD-MCNC: 13 G/DL — LOW (ref 14–18)
IMM GRANULOCYTES NFR BLD AUTO: 0.7 % — HIGH (ref 0.1–0.3)
LYMPHOCYTES # BLD AUTO: 1.07 K/UL — LOW (ref 1.2–3.4)
LYMPHOCYTES # BLD AUTO: 19.7 % — LOW (ref 20.5–51.1)
MAGNESIUM SERPL-MCNC: 2.1 MG/DL — SIGNIFICANT CHANGE UP (ref 1.8–2.4)
MCHC RBC-ENTMCNC: 29 PG — SIGNIFICANT CHANGE UP (ref 27–31)
MCHC RBC-ENTMCNC: 32.6 G/DL — SIGNIFICANT CHANGE UP (ref 32–37)
MCV RBC AUTO: 89.1 FL — SIGNIFICANT CHANGE UP (ref 80–94)
MONOCYTES # BLD AUTO: 0.59 K/UL — SIGNIFICANT CHANGE UP (ref 0.1–0.6)
MONOCYTES NFR BLD AUTO: 10.9 % — HIGH (ref 1.7–9.3)
NEUTROPHILS # BLD AUTO: 3.51 K/UL — SIGNIFICANT CHANGE UP (ref 1.4–6.5)
NEUTROPHILS NFR BLD AUTO: 64.8 % — SIGNIFICANT CHANGE UP (ref 42.2–75.2)
NRBC # BLD: 0 /100 WBCS — SIGNIFICANT CHANGE UP (ref 0–0)
PLATELET # BLD AUTO: 330 K/UL — SIGNIFICANT CHANGE UP (ref 130–400)
POTASSIUM SERPL-MCNC: 4.8 MMOL/L — SIGNIFICANT CHANGE UP (ref 3.5–5)
POTASSIUM SERPL-SCNC: 4.8 MMOL/L — SIGNIFICANT CHANGE UP (ref 3.5–5)
PROT SERPL-MCNC: 6.6 G/DL — SIGNIFICANT CHANGE UP (ref 6–8)
RBC # BLD: 4.48 M/UL — LOW (ref 4.7–6.1)
RBC # FLD: 11.7 % — SIGNIFICANT CHANGE UP (ref 11.5–14.5)
SODIUM SERPL-SCNC: 139 MMOL/L — SIGNIFICANT CHANGE UP (ref 135–146)
SYNOVIAL CRYSTALS CLARITY: ABNORMAL
SYNOVIAL CRYSTALS COLOR: ABNORMAL
SYNOVIAL CRYSTALS ID: SIGNIFICANT CHANGE UP
SYNOVIAL CRYSTALS TUBE: SIGNIFICANT CHANGE UP
WBC # BLD: 5.42 K/UL — SIGNIFICANT CHANGE UP (ref 4.8–10.8)
WBC # FLD AUTO: 5.42 K/UL — SIGNIFICANT CHANGE UP (ref 4.8–10.8)

## 2021-07-23 PROCEDURE — 93306 TTE W/DOPPLER COMPLETE: CPT | Mod: 26

## 2021-07-23 PROCEDURE — 99233 SBSQ HOSP IP/OBS HIGH 50: CPT

## 2021-07-23 RX ORDER — HYDROCORTISONE 1 %
1 OINTMENT (GRAM) TOPICAL DAILY
Refills: 0 | Status: DISCONTINUED | OUTPATIENT
Start: 2021-07-23 | End: 2021-07-26

## 2021-07-23 RX ADMIN — ENOXAPARIN SODIUM 40 MILLIGRAM(S): 100 INJECTION SUBCUTANEOUS at 11:16

## 2021-07-23 RX ADMIN — Medication 400 MILLIGRAM(S): at 17:51

## 2021-07-23 RX ADMIN — Medication 25 MILLIGRAM(S): at 22:52

## 2021-07-23 RX ADMIN — Medication 400 MILLIGRAM(S): at 06:03

## 2021-07-23 RX ADMIN — Medication 1 APPLICATION(S): at 17:27

## 2021-07-23 RX ADMIN — Medication 400 MILLIGRAM(S): at 11:16

## 2021-07-23 RX ADMIN — Medication 400 MILLIGRAM(S): at 11:40

## 2021-07-23 RX ADMIN — Medication 400 MILLIGRAM(S): at 17:27

## 2021-07-23 RX ADMIN — CEFTRIAXONE 100 MILLIGRAM(S): 500 INJECTION, POWDER, FOR SOLUTION INTRAMUSCULAR; INTRAVENOUS at 11:16

## 2021-07-23 RX ADMIN — AMLODIPINE BESYLATE 5 MILLIGRAM(S): 2.5 TABLET ORAL at 06:03

## 2021-07-23 RX ADMIN — Medication 400 MILLIGRAM(S): at 07:54

## 2021-07-23 RX ADMIN — Medication 400 MILLIGRAM(S): at 00:08

## 2021-07-23 NOTE — CONSULT NOTE ADULT - ASSESSMENT
49 YO M with a PMH of HTN, GERD, hx of R foot drop, prior episodes of MRSA cellulitis and hx of RLL DVT (completed Apixaban course) who presents to the hospital with a c/o worsening right ankle redness over the past x 4 days. Associated with swelling/pain and fevers.    IMPRESSION;  Septic arthritis right ankle  Pyotenosynovitis right extensor digitorum longus  Has overlying cellulitis more laterally but also medially  7/22 Arthrocentesis : WBC 54704 with 81% PMNx. Gm stain > PMNs, no org. Cultures p. Crystals NG  No evidence of inflammatory synovitis  R/o endocarditis  7/20 BCx NG    RECOMMENDATIONS;  F/u joint fluid cultures  ECHO  ESR/CRP  Nikky Zhangephin 2 gm iv q24h

## 2021-07-23 NOTE — PROGRESS NOTE ADULT - ASSESSMENT
ADMISSION SUMMARY  51 YO M with a PMH of HTN, GERD, hx of R foot drop, prior episodes of MRSA cellulitis and hx of RLL DVT (completed Apixaban course) who presents to the hospital with a c/o worsening right ankle redness over the past x 4 days. Associated with swelling/pain and fevers. Denies any trauma/falls. In the ED, XRs of RLE were negative for acute process. Dopplers of RLEs are negative. Cultures obtained, pt was started on IV ABXs.       #Cellulitis v. aseptic arthritis  - hx of R. foot drop and decreased sensation in R. foot.   - hx of MRSA cellulitis  - Erythema and edema of R. lateral and medial ankle with pain on active ROM - improving since yesterday  - pt. denies paresthesias with passive ROM  - MRSA nares neg  - ESR on admission wnl (2)  - pt. afebrile since 00:35 7/20  - foot x-ray no evidence for fracture or dislocation  - CT revealed large ankle joint effusion with extension into the flexor hallucis longus and subtalar joint effusion  - S/p arthrocentesis by Ortho 7/22  - Fluid - cloudy with 30k cells and 80% segmented granulocytes  - Gram stain neg for organisms with many WBCs  - Crystals neg  - ID recs appreciated  - Cont. ceftriaxone 2g q24  - F/u joint fluid cultures  - Get new ESR & CRP  - F/u Echo  - F/u Lyme serology  - F/u BRET, RF, C3  - cont. motrin 400mg q6 for pain    #HTN  - cont. amlodipine 5mg PO daily  - d/c losartan due to hyperkalemia  - will need follow-up outpatient for BP management     #Hyperkalemia - resolved  - avoid ACEi/ARBs     #transaminitis  - pt. denies abdominal pain  - reports excessive use of acetaminophen prior to admission  - LFT stable  - will need follow-up with GI outpatient  - trend CMP  - t.bili wnl    #GERD  - cont. protonix 40mg po daily    Dvt ppx: lovenox 40mg  PPI ppx: protonix 40mg  Diet: DASH  Dispo:  acute  Activity: as tolerated   ADMISSION SUMMARY  49 YO M with a PMH of HTN, GERD, hx of R foot drop, prior episodes of MRSA cellulitis and hx of RLL DVT (completed Apixaban course) who presents to the hospital with a c/o worsening right ankle redness over the past x 4 days. Associated with swelling/pain and fevers. Denies any trauma/falls. In the ED, XRs of RLE were negative for acute process. Dopplers of RLEs are negative. Cultures obtained, pt was started on IV ABXs.       #Cellulitis v. aseptic arthritis  - hx of R. foot drop and decreased sensation in R. foot.   - hx of MRSA cellulitis  - Erythema and edema of R. lateral and medial ankle with pain on active ROM - improving since yesterday  - pt. denies paresthesias with passive ROM  - MRSA nares neg  - ESR on admission wnl (2) -> raised to 67 on 7/23  - pt. afebrile since 00:35 7/20  - foot x-ray no evidence for fracture or dislocation  - CT revealed large ankle joint effusion with extension into the flexor hallucis longus and subtalar joint effusion  - S/p arthrocentesis by Ortho 7/22  - Fluid - cloudy with 30k cells and 80% segmented granulocytes  - Gram stain neg for organisms with many WBCs  - Crystals neg  - ID recs appreciated  - Cont. ceftriaxone 2g q24  - F/u joint fluid cultures  - Get new ESR & CRP  - F/u Echo  - F/u Lyme serology  - F/u BRET, RF, C3  - cont. motrin 400mg q6 for pain    #HTN  - cont. amlodipine 5mg PO daily  - d/c losartan due to hyperkalemia  - will need follow-up outpatient for BP management     #Hyperkalemia - resolved  - avoid ACEi/ARBs     #transaminitis  - pt. denies abdominal pain  - reports excessive use of acetaminophen prior to admission  - LFT stable  - will need follow-up with GI outpatient  - trend CMP  - t.bili wnl    #GERD  - cont. protonix 40mg po daily    Dvt ppx: lovenox 40mg  PPI ppx: protonix 40mg  Diet: DASH  Dispo:  acute  Activity: as tolerated

## 2021-07-23 NOTE — PROGRESS NOTE ADULT - SUBJECTIVE AND OBJECTIVE BOX
EFRAÍN BRADFORD  50y  Male      Patient is a 50y old  Male who presents with a chief complaint of Right ankle edema, pain, erythema (23 Jul 2021 10:33)      INTERVAL HPI/OVERNIGHT EVENTS: no acute events overnight. patient notes pain is mildly better. Unable to bear weight on it thought. s/p joint effusion aspiration(therapeutic and diagnostic) by Ortho yesterday.       REVIEW OF SYSTEMS:  CONSTITUTIONAL: No fever, weight loss, or fatigue  RESPIRATORY: No cough, wheezing, chills or hemoptysis; No shortness of breath  CARDIOVASCULAR: No chest pain, palpitations, dizziness, or leg swelling  GASTROINTESTINAL: No abdominal or epigastric pain. No nausea, vomiting, or hematemesis; No diarrhea or constipation. No melena or hematochezia.  GENITOURINARY: No dysuria, frequency, hematuria, or incontinence  NEUROLOGICAL: No headaches, memory loss, loss of strength, numbness, or tremors  SKIN: No itching, burning, rashes, or lesions   MUSCULOSKELETAL: No joint pain or swelling; No muscle, back, or extremity pain  PSYCHIATRIC: No depression, anxiety, mood swings, or difficulty sleeping  All other review of systems negative    VITALS  T(C): 36 (07-23-21 @ 06:06), Max: 36.2 (07-22-21 @ 22:43)  HR: 80 (07-23-21 @ 06:06) (76 - 80)  BP: 124/80 (07-23-21 @ 06:06) (124/80 - 130/77)  RR: 18 (07-23-21 @ 06:06) (18 - 18)  SpO2: --  Wt(kg): --Vital Signs Last 24 Hrs  T(C): 36 (23 Jul 2021 06:06), Max: 36.2 (22 Jul 2021 22:43)  T(F): 96.8 (23 Jul 2021 06:06), Max: 97.1 (22 Jul 2021 22:43)  HR: 80 (23 Jul 2021 06:06) (76 - 80)  BP: 124/80 (23 Jul 2021 06:06) (124/80 - 130/77)  BP(mean): --  RR: 18 (23 Jul 2021 06:06) (18 - 18)  SpO2: --    PHYSICAL EXAM:  GENERAL: NAD, well-groomed, well-developed  PSYCH: no agitation, baseline mentation  NERVOUS SYSTEM:  Alert & Oriented X3, Motor Strength 5/5 B/L upper and lower extremities; Sensory intact; FTN WNL  PULMONARY: Clear to percussion bilaterally; No rales, rhonchi, wheezing, or rubs  CARDIOVASCULAR: Regular rate and rhythm; No murmurs, rubs, or gallops  GI: Soft, Nontender, Nondistended; Bowel sounds present  EXTREMITIES:  2+ Peripheral Pulses, No clubbing, cyanosis, or edema  LYMPH: No lymphadenopathy noted  SKIN: No rashes or lesions    Consultant(s) Notes Reviewed:  [x ] YES  [ ] NO    Discussed with Consultants/Other Providers [ x] YES     LABS                          13.0   5.42  )-----------( 330      ( 23 Jul 2021 07:18 )             39.9     07-23    139  |  105  |  18  ----------------------------<  91  4.8   |  25  |  0.9    Ca    9.0      23 Jul 2021 07:18  Mg     2.1     07-23    TPro  6.6  /  Alb  3.9  /  TBili  0.3  /  DBili  x   /  AST  43<H>  /  ALT  90<H>  /  AlkPhos  223<H>  07-23      RADIOLOGY & ADDITIONAL TESTS:  CT Ankle w/ IV Cont, Right (07.21.21 @ 23:26)     IMPRESSION:  Bimalleolar and diffuse foot subcutaneous edema, without abscess.  No acute osseous abnormality.  Large ankle joint effusion with extension into the flexor hallucis longus tendon sheath, and moderate posterior subtalar joint effusion.      MEDICATIONS  (STANDING):  amLODIPine   Tablet 5 milliGRAM(s) Oral daily  cefTRIAXone   IVPB      cefTRIAXone   IVPB 2000 milliGRAM(s) IV Intermittent every 24 hours  enoxaparin Injectable 40 milliGRAM(s) SubCutaneous daily  ibuprofen  Tablet. 400 milliGRAM(s) Oral every 6 hours  pantoprazole    Tablet 40 milliGRAM(s) Oral before breakfast    MEDICATIONS  (PRN):  diphenhydrAMINE 25 milliGRAM(s) Oral every 4 hours PRN Rash and/or Itching

## 2021-07-23 NOTE — PROGRESS NOTE ADULT - ASSESSMENT
49 YO M with a PMH of HTN, GERD, hx of R foot drop, prior episodes of MRSA cellulitis and hx of RLL DVT (completed Apixaban course) who presents to the hospital with a c/o worsening right ankle redness over the past x 4 days. Associated with swelling/pain and fevers. Denies any trauma/falls. In the ED, XRs of RLE were negative for acute process. Dopplers of RLEs are negative. Cultures obtained, pt was started on IV ABXs.       #Cellulitis  #Septic Arthritis  # Pyotenosynovitis right extensor digitorum longus  - hx of R. foot drop and decreased sensation in R. foot. (baseline)  - hx of MRSA cellulitis; negative nare swab on this admission  - Erythema and edema of R. lateral and medial ankle with pain on active ROM -  stable with no improvement on Clinda + Vanc  - pt. denies paresthesias with passive ROM  - foot x-ray no evidence for fracture or dislocation  - pt. afebrile since 00:35 7/20  - CT demonstrating large joint effusion but no abscess collection  - s/p arthocentesis; sample was cloudy, WBC 92524 with 81% PMNx. Gm stain > PMNs, no org. Cultures p. Crystals NG  - discontinued clinda and vancomycin on 7/22, started Ceftriaxone 2g q24h for gram (-) coverage  - give motrin 400mg q6  - f/u blood cultures  - ID following; recs appreciated       F/u joint fluid cultures       ECHO       ESR/CRP       Nares ORSA       Rocephin 2 gm iv q24h    #HTN  - Start amlodipine 5mg PO daily  - d/c losartan due to hyperkalemia  - will need follow-up outpatient for BP management     #Hyperkalemia, RESOLVED  - give lokelma 10g once  - f/u K  - d/c losartan - avoid ACEi/ARBs    #transaminitis, IMPROVING  - unsure etiology; hepatocellular injury distribution  - pt. denies abdominal pain  - trend CMP  - t.bili wnl  - will consider RUQ if worsens or new sx    #GERD  - cont. protonix 40mg po daily      Dvt ppx: lovenox 40mg  PPI ppx: protonix 40mg  Diet: DASH  Activity: as tolerated    #Progress Note Handoff  Pending (specify):  ID consult, clinical improvement, ECHO,   Family discussion: patient aware of plan. all questions answered. in agreement  Disposition: Unknown at this time________

## 2021-07-23 NOTE — PROGRESS NOTE ADULT - SUBJECTIVE AND OBJECTIVE BOX
Orthopaedic Surgery Progress Note    EFRAÍN BRADFORD  [id number]    50M with left ankle pain, orthopedics consulted for r/o septic ankle. Tapped yesterday, WBC 30k with numerous RBC, no crystals, not indicative of any infection. Final cultures pending, blood cx and arthrocentesis cx pending. Doing well. S&E at bedside this AM. Pain well controlled. Denies fever/chills/CP/SOB. No other complaints    amLODIPine   Tablet 5 milliGRAM(s) Oral daily  cefTRIAXone   IVPB      cefTRIAXone   IVPB 2000 milliGRAM(s) IV Intermittent every 24 hours  diphenhydrAMINE 25 milliGRAM(s) Oral every 4 hours PRN  enoxaparin Injectable 40 milliGRAM(s) SubCutaneous daily  ibuprofen  Tablet. 400 milliGRAM(s) Oral every 6 hours  pantoprazole    Tablet 40 milliGRAM(s) Oral before breakfast      T(C): 36 (07-23-21 @ 06:06), Max: 36.2 (07-22-21 @ 22:43)  HR: 80 (07-23-21 @ 06:06) (76 - 86)  BP: 124/80 (07-23-21 @ 06:06) (124/80 - 130/77)  RR: 18 (07-23-21 @ 06:06) (18 - 18)  SpO2: --    P/E:  NAD  Breathing comfortably on RA  Resting comfortably    RLE  Mild cellulitis / erythema over ankle, improving  Mild TTP, improving  SILT sp/dp/t/sural/saph  Firing ta/ehl/fhl/gs  Foot WWP, 2+ DP pulse    Labs                        13.0   5.42  )-----------( 330      ( 23 Jul 2021 07:18 )             39.9     07-23    139  |  105  |  18  ----------------------------<  91  4.8   |  25  |  0.9    Ca    9.0      23 Jul 2021 07:18  Mg     2.1     07-23    TPro  6.6  /  Alb  3.9  /  TBili  0.3  /  DBili  x   /  AST  43<H>  /  ALT  90<H>  /  AlkPhos  223<H>  07-23    LIVER FUNCTIONS - ( 23 Jul 2021 07:18 )  Alb: 3.9 g/dL / Pro: 6.6 g/dL / ALK PHOS: 223 U/L / ALT: 90 U/L / AST: 43 U/L / GGT: x             A/P: 50yMale with right ankle pain, swelling, patches of erythematous skin changes, ortho consulted for rule out right ankle septic joint. Arthrocentesis results: WBC 30k with numerous RBC, no crystals, not indicative of any infection    -Diet ok  -WBAT  -PT / OT  -Will Fu cx of aspiration (NGTD), no acute orthopedic intervention / surgery currently planned           Orthopaedic Surgery Progress Note    EFRAÍN BRADFORD  [id number]    50M with left ankle pain, orthopedics consulted for r/o septic ankle. Tapped yesterday, WBC 30k with numerous RBC, no crystals, not indicative of any infection. Final cultures pending, blood cx and arthrocentesis cx pending. Doing well. S&E at bedside this AM. Pain well controlled. Denies fever/chills/CP/SOB. No other complaints    amLODIPine   Tablet 5 milliGRAM(s) Oral daily  cefTRIAXone   IVPB      cefTRIAXone   IVPB 2000 milliGRAM(s) IV Intermittent every 24 hours  diphenhydrAMINE 25 milliGRAM(s) Oral every 4 hours PRN  enoxaparin Injectable 40 milliGRAM(s) SubCutaneous daily  ibuprofen  Tablet. 400 milliGRAM(s) Oral every 6 hours  pantoprazole    Tablet 40 milliGRAM(s) Oral before breakfast      T(C): 36 (07-23-21 @ 06:06), Max: 36.2 (07-22-21 @ 22:43)  HR: 80 (07-23-21 @ 06:06) (76 - 86)  BP: 124/80 (07-23-21 @ 06:06) (124/80 - 130/77)  RR: 18 (07-23-21 @ 06:06) (18 - 18)  SpO2: --    P/E:  NAD  Breathing comfortably on RA  Resting comfortably    RLE  Mild cellulitis / erythema over ankle, improving  Mild TTP, improving  SILT sp/dp/t/sural/saph  Firing ta/ehl/fhl/gs  Foot WWP, 2+ DP pulse    Labs                        13.0   5.42  )-----------( 330      ( 23 Jul 2021 07:18 )             39.9     07-23    139  |  105  |  18  ----------------------------<  91  4.8   |  25  |  0.9    Ca    9.0      23 Jul 2021 07:18  Mg     2.1     07-23    TPro  6.6  /  Alb  3.9  /  TBili  0.3  /  DBili  x   /  AST  43<H>  /  ALT  90<H>  /  AlkPhos  223<H>  07-23    LIVER FUNCTIONS - ( 23 Jul 2021 07:18 )  Alb: 3.9 g/dL / Pro: 6.6 g/dL / ALK PHOS: 223 U/L / ALT: 90 U/L / AST: 43 U/L / GGT: x             A/P: 50yMale with right ankle pain, swelling, patches of erythematous skin changes, ortho consulted for rule out right ankle septic joint. Arthrocentesis results: WBC 30k with numerous RBC, no crystals, not indicative of any infection    -Diet ok  -WBAT  -PT / OT  -Will Fu cx of aspiration (NGTD), no acute orthopedic intervention / surgery currently planned  orthopedically stable  wbc 5.4   feeling better  cell count 30 K   - GM stain  cultures pending   not likely septic ankle  will follow

## 2021-07-23 NOTE — PROGRESS NOTE ADULT - SUBJECTIVE AND OBJECTIVE BOX
EFRAÍN BRADFORD 50y Male  MRN#: [id number]   CODE STATUS: FULL      SUBJECTIVE  Patient is a 50y old Male who presents with a chief complaint of Right ankle edema, pain, erythema (23 Jul 2021 10:01)  Currently admitted to medicine with the primary diagnosis of Cellulitis    Today is hospital day 4d, and this morning he is resting in bed reporting minimal ankle pain and reports no overnight events.     Present Today:           [place] Catheter ()No/ ()Yes? Indication:          Central Line ()No/ ()Yes? Indication:          IV Fluids ()No/ ()Yes? Type:  Rate:  Indication:      OBJECTIVE  PAST MEDICAL & SURGICAL HISTORY  HTN (hypertension)    Acid reflux      Home Meds:  Benicar 20 mg oral tablet: 1 tab(s) orally once a day  NexIUM 40 mg oral delayed release capsule: 1 cap(s) orally once a day    ALLERGIES:  No Known Allergies    MEDICATIONS:  STANDING MEDICATIONS  amLODIPine   Tablet 5 milliGRAM(s) Oral daily  cefTRIAXone   IVPB      cefTRIAXone   IVPB 2000 milliGRAM(s) IV Intermittent every 24 hours  enoxaparin Injectable 40 milliGRAM(s) SubCutaneous daily  ibuprofen  Tablet. 400 milliGRAM(s) Oral every 6 hours  pantoprazole    Tablet 40 milliGRAM(s) Oral before breakfast    PRN MEDICATIONS  diphenhydrAMINE 25 milliGRAM(s) Oral every 4 hours PRN      VITAL SIGNS: Last 24 Hours  T(C): 36 (23 Jul 2021 06:06), Max: 36.2 (22 Jul 2021 22:43)  T(F): 96.8 (23 Jul 2021 06:06), Max: 97.1 (22 Jul 2021 22:43)  HR: 80 (23 Jul 2021 06:06) (76 - 86)  BP: 124/80 (23 Jul 2021 06:06) (124/80 - 130/77)  BP(mean): --  RR: 18 (23 Jul 2021 06:06) (18 - 18)  SpO2: --    LABS:                        13.0   5.42  )-----------( 330      ( 23 Jul 2021 07:18 )             39.9     07-23    139  |  105  |  18  ----------------------------<  91  4.8   |  25  |  0.9    Ca    9.0      23 Jul 2021 07:18  Mg     2.1     07-23    TPro  6.6  /  Alb  3.9  /  TBili  0.3  /  DBili  x   /  AST  43<H>  /  ALT  90<H>  /  AlkPhos  223<H>  07-23              Culture - Blood (collected 20 Jul 2021 16:00)  Source: .Blood Blood  Preliminary Report (22 Jul 2021 02:02):    No growth to date.          RADIOLOGY:  < from: CT Ankle w/ IV Cont, Right (07.21.21 @ 23:26) >  IMPRESSION:    Bimalleolar and diffuse foot subcutaneous edema, without abscess.    No acute osseous abnormality.    Large ankle joint effusion with extension into the flexor hallucis longus tendon sheath, and moderate posterior subtalar joint effusion.    --- End of Report ---    < end of copied text >  < from: Xray Foot AP + Lateral + Oblique, Right (07.19.21 @ 13:46) >  Impression:  No acute fracture or dislocation.    < end of copied text >      PHYSICAL EXAM:  General: NAD, AAOx3  HEENT: atraumatic, normocephalic, EOMI, PERRLA, conjunctiva and sclera clear, No JVD  Pulmonary: clear to auscultation bilaterally; No wheeze  Cardiovascular: Regular rate and rhythm; no murmurs, rubs or gallops. Normal S1S2  Gastrointestinal: Soft, nontender, nondistended; bowel sounds present  Musculoskeletal: 2+ peripheral pulses, no clubbing, cyanosis or edema  Neurology: non-focal, Pt. alert and oriented, fluent speech, able to move all extremities  Skin: +erythema and swelling of R. lateral and medial ankle with pain on active ROM. Pt. denies paraesthesias with passive ROM. Improving erythema from yesterday. +pruritic papular rash on back not improved with benadryl

## 2021-07-23 NOTE — CONSULT NOTE ADULT - EXTREMITIES COMMENTS
R foot with no overt pain on movement. Edema. Erythema laterally till forefoot. Medially erythema inferior to medial malleolus

## 2021-07-23 NOTE — CONSULT NOTE ADULT - SUBJECTIVE AND OBJECTIVE BOX
SANCHEZEFRAÍN RODRIGUEZ  50y, Male  Allergy: No Known Allergies      All historical available data reviewed.    HPI:  50 year old male patient presented for right ankle dolor, calor, rubor, diagnosed with cellulitis, admitted for management.     Known hypertensive, GERD, history of right foot drop since 2019, history of RLL DVT in Jan 2021 received full course of apixaban and discontinued it.     Current history goes back to 4 days prior to presentation when the patient began to note gradual progression of erythema, followed by edema and then severe pain limiting range of motion. Patient admits fever of 101.4 on 2 days prior to presentation as well. 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 No paresthesias, focal weakness, or other associated complaints at present.   He is admitted for management. (19 Jul 2021 15:54)    S/p arthrocentesis 7/22    FAMILY HISTORY:    PAST MEDICAL & SURGICAL HISTORY:  HTN (hypertension)    Acid reflux          VITALS:  T(F): 96.8, Max: 97.1 (07-22-21 @ 22:43)  HR: 80  BP: 124/80  RR: 18Vital Signs Last 24 Hrs  T(C): 36 (23 Jul 2021 06:06), Max: 36.2 (22 Jul 2021 22:43)  T(F): 96.8 (23 Jul 2021 06:06), Max: 97.1 (22 Jul 2021 22:43)  HR: 80 (23 Jul 2021 06:06) (76 - 86)  BP: 124/80 (23 Jul 2021 06:06) (124/80 - 130/77)  BP(mean): --  RR: 18 (23 Jul 2021 06:06) (18 - 18)  SpO2: --    TESTS & MEASUREMENTS:                        13.0   5.42  )-----------( 330      ( 23 Jul 2021 07:18 )             39.9     07-23    139  |  105  |  18  ----------------------------<  91  4.8   |  25  |  0.9    Ca    9.0      23 Jul 2021 07:18  Mg     2.1     07-23    TPro  6.6  /  Alb  3.9  /  TBili  0.3  /  DBili  x   /  AST  43<H>  /  ALT  90<H>  /  AlkPhos  223<H>  07-23    LIVER FUNCTIONS - ( 23 Jul 2021 07:18 )  Alb: 3.9 g/dL / Pro: 6.6 g/dL / ALK PHOS: 223 U/L / ALT: 90 U/L / AST: 43 U/L / GGT: x             Culture - Blood (collected 07-20-21 @ 16:00)  Source: .Blood Blood  Preliminary Report (07-22-21 @ 02:02):    No growth to date.            RADIOLOGY & ADDITIONAL TESTS:  Personal review of radiological diagnostics performed  Echo and EKG results noted when applicable.     MEDICATIONS:  amLODIPine   Tablet 5 milliGRAM(s) Oral daily  cefTRIAXone   IVPB      cefTRIAXone   IVPB 2000 milliGRAM(s) IV Intermittent every 24 hours  diphenhydrAMINE 25 milliGRAM(s) Oral every 4 hours PRN  enoxaparin Injectable 40 milliGRAM(s) SubCutaneous daily  ibuprofen  Tablet. 400 milliGRAM(s) Oral every 6 hours  pantoprazole    Tablet 40 milliGRAM(s) Oral before breakfast      ANTIBIOTICS:  cefTRIAXone   IVPB      cefTRIAXone   IVPB 2000 milliGRAM(s) IV Intermittent every 24 hours

## 2021-07-24 LAB
ALBUMIN SERPL ELPH-MCNC: 4.1 G/DL — SIGNIFICANT CHANGE UP (ref 3.5–5.2)
ALP SERPL-CCNC: 204 U/L — HIGH (ref 30–115)
ALT FLD-CCNC: 75 U/L — HIGH (ref 0–41)
ANION GAP SERPL CALC-SCNC: 9 MMOL/L — SIGNIFICANT CHANGE UP (ref 7–14)
APTT BLD: 31.8 SEC — SIGNIFICANT CHANGE UP (ref 27–39.2)
AST SERPL-CCNC: 29 U/L — SIGNIFICANT CHANGE UP (ref 0–41)
BILIRUB SERPL-MCNC: 0.3 MG/DL — SIGNIFICANT CHANGE UP (ref 0.2–1.2)
BUN SERPL-MCNC: 17 MG/DL — SIGNIFICANT CHANGE UP (ref 10–20)
CALCIUM SERPL-MCNC: 9.5 MG/DL — SIGNIFICANT CHANGE UP (ref 8.5–10.1)
CHLORIDE SERPL-SCNC: 105 MMOL/L — SIGNIFICANT CHANGE UP (ref 98–110)
CO2 SERPL-SCNC: 27 MMOL/L — SIGNIFICANT CHANGE UP (ref 17–32)
CREAT SERPL-MCNC: 0.8 MG/DL — SIGNIFICANT CHANGE UP (ref 0.7–1.5)
CRP SERPL-MCNC: 47 MG/L — HIGH
GLUCOSE SERPL-MCNC: 90 MG/DL — SIGNIFICANT CHANGE UP (ref 70–99)
INR BLD: 1.11 RATIO — SIGNIFICANT CHANGE UP (ref 0.65–1.3)
MAGNESIUM SERPL-MCNC: 2.2 MG/DL — SIGNIFICANT CHANGE UP (ref 1.8–2.4)
POTASSIUM SERPL-MCNC: 4.6 MMOL/L — SIGNIFICANT CHANGE UP (ref 3.5–5)
POTASSIUM SERPL-SCNC: 4.6 MMOL/L — SIGNIFICANT CHANGE UP (ref 3.5–5)
PROT SERPL-MCNC: 6.8 G/DL — SIGNIFICANT CHANGE UP (ref 6–8)
PROTHROM AB SERPL-ACNC: 12.8 SEC — SIGNIFICANT CHANGE UP (ref 9.95–12.87)
RHEUMATOID FACT SERPL-ACNC: <10 IU/ML — SIGNIFICANT CHANGE UP (ref 0–13)
SODIUM SERPL-SCNC: 141 MMOL/L — SIGNIFICANT CHANGE UP (ref 135–146)

## 2021-07-24 PROCEDURE — 99233 SBSQ HOSP IP/OBS HIGH 50: CPT

## 2021-07-24 RX ADMIN — Medication 400 MILLIGRAM(S): at 06:55

## 2021-07-24 RX ADMIN — CEFTRIAXONE 100 MILLIGRAM(S): 500 INJECTION, POWDER, FOR SOLUTION INTRAMUSCULAR; INTRAVENOUS at 08:15

## 2021-07-24 RX ADMIN — Medication 1 APPLICATION(S): at 11:43

## 2021-07-24 RX ADMIN — PANTOPRAZOLE SODIUM 40 MILLIGRAM(S): 20 TABLET, DELAYED RELEASE ORAL at 06:21

## 2021-07-24 RX ADMIN — ENOXAPARIN SODIUM 40 MILLIGRAM(S): 100 INJECTION SUBCUTANEOUS at 11:44

## 2021-07-24 RX ADMIN — Medication 400 MILLIGRAM(S): at 17:30

## 2021-07-24 RX ADMIN — AMLODIPINE BESYLATE 5 MILLIGRAM(S): 2.5 TABLET ORAL at 06:21

## 2021-07-24 RX ADMIN — Medication 400 MILLIGRAM(S): at 17:00

## 2021-07-24 RX ADMIN — Medication 400 MILLIGRAM(S): at 06:21

## 2021-07-24 NOTE — PROGRESS NOTE ADULT - ASSESSMENT
ADMISSION SUMMARY  51 YO M with a PMH of HTN, GERD, hx of R foot drop, prior episodes of MRSA cellulitis and hx of RLL DVT (completed Apixaban course) who presents to the hospital with a c/o worsening right ankle redness over the past x 4 days. Associated with swelling/pain and fevers. Denies any trauma/falls. In the ED, XRs of RLE were negative for acute process. Dopplers of RLEs are negative. Cultures obtained, pt was started on IV ABXs.       #Cellulitis v. aseptic arthritis  - hx of R. foot drop and decreased sensation in R. foot.   - hx of MRSA cellulitis  - Erythema and edema of R. lateral and medial ankle with pain on active ROM - improving since yesterday  - pt. denies paresthesias with passive ROM  - MRSA nares neg  - ESR on admission wnl (2) -> raised to 67 on 7/23  - pt. afebrile since 00:35 7/20  - foot x-ray no evidence for fracture or dislocation  - CT revealed large ankle joint effusion with extension into the flexor hallucis longus and subtalar joint effusion  - S/p arthrocentesis by Ortho 7/22  - Fluid - cloudy with 30k cells and 80% segmented granulocytes  - Gram stain neg for organisms with many WBCs  - Crystals neg  - ID recs appreciated  - Cont. ceftriaxone 2g q24  - F/u joint fluid cultures  - F/u Echo  - F/u Lyme serology  - F/u BRET, RF, C3  - cont. motrin 400mg q6 for pain    #HTN  - cont. amlodipine 5mg PO daily  - d/c losartan due to hyperkalemia  - will need follow-up outpatient for BP management     #Hyperkalemia - resolved  - avoid ACEi/ARBs     #transaminitis - downtrending  - pt. denies abdominal pain  - reports excessive use of acetaminophen prior to admission  - LFT stable  - will need follow-up with GI outpatient  - trend CMP    #GERD  - cont. protonix 40mg po daily    Dvt ppx: lovenox 40mg  PPI ppx: protonix 40mg  Diet: DASH  Dispo:  acute  Activity: as tolerated

## 2021-07-24 NOTE — PROGRESS NOTE ADULT - ASSESSMENT
49 YO M with a PMH of HTN, GERD, hx of R foot drop, prior episodes of MRSA cellulitis and hx of RLL DVT (completed Apixaban course) who presents to the hospital with a c/o worsening right ankle redness over the past x 4 days. Associated with swelling/pain and fevers. Denies any trauma/falls. In the ED, XRs of RLE were negative for acute process. Dopplers of RLEs are negative. Cultures obtained, pt was started on IV ABXs.       #Cellulitis  #Septic Arthritis  # Pyotenosynovitis right extensor digitorum longus  - hx of R. foot drop and decreased sensation in R. foot. (baseline)  - hx of MRSA cellulitis; negative nare swab on this admission  - Erythema and edema of R. lateral and medial ankle with pain on active ROM -  stable with no improvement on Clinda + Vanc  - pt. denies paresthesias with passive ROM  - foot x-ray no evidence for fracture or dislocation  - pt. afebrile since 00:35 7/20  - CT demonstrating large joint effusion but no abscess collection  - s/p arthocentesis; sample was cloudy, WBC 64681 with 81% PMNx. Gm stain > PMNs, no org. Cultures p. Crystals NG  - discontinued clinda and vancomycin on 7/22, started Ceftriaxone 2g q24h for gram (-) coverage  - give motrin 400mg q6  - f/u blood cultures  - ID following; recs appreciated       F/u joint fluid cultures       ECHO       ESR/CRP       Nares ORSA       Rocephin 2 gm iv q24h    #HTN  - Start amlodipine 5mg PO daily  - d/c losartan due to hyperkalemia  - will need follow-up outpatient for BP management     #Hyperkalemia, RESOLVED  - give lokelma 10g once  - f/u K  - d/c losartan - avoid ACEi/ARBs    #transaminitis, IMPROVING  - unsure etiology; hepatocellular injury distribution  - pt. denies abdominal pain  - trend CMP  - t.bili wnl  - will consider RUQ if worsens or new sx    #GERD  - cont. protonix 40mg po daily      Dvt ppx: lovenox 40mg  PPI ppx: protonix 40mg  Diet: DASH  Activity: as tolerated    #Progress Note Handoff  Pending (specify):  ID follow up clinical improvement,   Family discussion: patient aware of plan. all questions answered. in agreement  Disposition: anticipated for tomorrow

## 2021-07-24 NOTE — PROGRESS NOTE ADULT - SUBJECTIVE AND OBJECTIVE BOX
SANCHEZEFRAÍN RODRIGUEZ  50y  Male      Patient is a 50y old  Male who presents with a chief complaint of Right ankle edema, pain, erythema (24 Jul 2021 11:35)      INTERVAL HPI/OVERNIGHT EVENTS: no acute events overnight. pain improved. rash improved.      REVIEW OF SYSTEMS:  CONSTITUTIONAL: No fever, weight loss, or fatigue  RESPIRATORY: No cough, wheezing, chills or hemoptysis; No shortness of breath  CARDIOVASCULAR: No chest pain, palpitations, dizziness, or leg swelling  GASTROINTESTINAL: No abdominal or epigastric pain. No nausea, vomiting, or hematemesis; No diarrhea or constipation. No melena or hematochezia.  GENITOURINARY: No dysuria, frequency, hematuria, or incontinence  NEUROLOGICAL: No headaches, memory loss, loss of strength, numbness, or tremors  SKIN: No itching, burning, rashes, or lesions   MUSCULOSKELETAL: No joint pain or swelling; No muscle, back, or extremity pain  PSYCHIATRIC: No depression, anxiety, mood swings, or difficulty sleeping  All other review of systems negative    VITALS  T(C): 36.5 (07-24-21 @ 06:51), Max: 36.5 (07-24-21 @ 06:51)  HR: 75 (07-24-21 @ 08:00) (72 - 77)  BP: 135/76 (07-24-21 @ 06:51) (130/74 - 135/76)  RR: 17 (07-24-21 @ 08:00) (17 - 18)  SpO2: 96% (07-24-21 @ 08:00) (96% - 96%)  Wt(kg): --Vital Signs Last 24 Hrs  T(C): 36.5 (24 Jul 2021 06:51), Max: 36.5 (24 Jul 2021 06:51)  T(F): 97.7 (24 Jul 2021 06:51), Max: 97.7 (24 Jul 2021 06:51)  HR: 75 (24 Jul 2021 08:00) (72 - 77)  BP: 135/76 (24 Jul 2021 06:51) (130/74 - 135/76)  BP(mean): --  RR: 17 (24 Jul 2021 08:00) (17 - 18)  SpO2: 96% (24 Jul 2021 08:00) (96% - 96%)      07-24-21 @ 07:01  -  07-24-21 @ 12:37  --------------------------------------------------------  IN: 50 mL / OUT: 0 mL / NET: 50 mL        PHYSICAL EXAM:  GENERAL: NAD, well-groomed, well-developed  EYES: anicteric sclera, non injected conjunctiva, EOMI  ENT: hearing grossly intact, oropharyx clear, MMM  PSYCH: no agitation, baseline mentation  NERVOUS SYSTEM:  Alert & Oriented X3, Motor Strength 5/5 B/L upper and lower extremities;  PULMONARY: Clear to percussion bilaterally; No rales, rhonchi, wheezing, or rubs  CARDIOVASCULAR: Regular rate and rhythm; No murmurs, rubs, or gallops  GI: Soft, Nontender, Nondistended; Bowel sounds present  EXTREMITIES:  2+ Peripheral Pulses, No clubbing, cyanosis; faintly erythematous, poorly circumscribed area that is warm to touch on right ankle-intervally improved  LYMPH: No lymphadenopathy noted  SKIN: No rashes or lesions    Consultant(s) Notes Reviewed:  [x ] YES  [ ] NO    Discussed with Consultants/Other Providers [ x] YES     LABS                          13.0   5.42  )-----------( 330      ( 23 Jul 2021 07:18 )             39.9     07-24    141  |  105  |  17  ----------------------------<  90  4.6   |  27  |  0.8    Ca    9.5      24 Jul 2021 04:30  Mg     2.2     07-24    TPro  6.8  /  Alb  4.1  /  TBili  0.3  /  DBili  x   /  AST  29  /  ALT  75<H>  /  AlkPhos  204<H>  07-24  PT/INR - ( 24 Jul 2021 04:30 )   PT: 12.80 sec;   INR: 1.11 ratio    PTT - ( 24 Jul 2021 04:30 )  PTT:31.8 sec    RADIOLOGY & ADDITIONAL TESTS:  CT Ankle w/ IV Cont, Right (07.21.21 @ 23:26)     IMPRESSION:  Bimalleolar and diffuse foot subcutaneous edema, without abscess.  No acute osseous abnormality.  Large ankle joint effusion with extension into the flexor hallucis longus tendon sheath, and moderate posterior subtalar joint effusion.      MEDICATIONS  (STANDING):  amLODIPine   Tablet 5 milliGRAM(s) Oral daily  cefTRIAXone   IVPB      cefTRIAXone   IVPB 2000 milliGRAM(s) IV Intermittent every 24 hours  enoxaparin Injectable 40 milliGRAM(s) SubCutaneous daily  ibuprofen  Tablet. 400 milliGRAM(s) Oral every 6 hours  pantoprazole    Tablet 40 milliGRAM(s) Oral before breakfast    MEDICATIONS  (PRN):  diphenhydrAMINE 25 milliGRAM(s) Oral every 4 hours PRN Rash and/or Itching

## 2021-07-24 NOTE — PROGRESS NOTE ADULT - SUBJECTIVE AND OBJECTIVE BOX
EFRAÍN BRADFORD 50y Male  MRN#: [id number]   CODE STATUS: FULL      SUBJECTIVE  Patient is a 50y old Male who presents with a chief complaint of Right ankle edema, pain, erythema (23 Jul 2021 14:04)  Currently admitted to medicine with the primary diagnosis of Cellulitis    Today is hospital day 5d, and this morning he is resting in bed comfortably and reports no overnight events.     Present Today:           [place] Catheter (x)No/ ()Yes? Indication:          Central Line (x)No/ ()Yes? Indication:          IV Fluids (x)No/ ()Yes? Type:  Rate:  Indication:      OBJECTIVE  PAST MEDICAL & SURGICAL HISTORY  HTN (hypertension)    Acid reflux      Home Meds:  Benicar 20 mg oral tablet: 1 tab(s) orally once a day  NexIUM 40 mg oral delayed release capsule: 1 cap(s) orally once a day    ALLERGIES:  No Known Allergies    MEDICATIONS:  STANDING MEDICATIONS  amLODIPine   Tablet 5 milliGRAM(s) Oral daily  cefTRIAXone   IVPB      cefTRIAXone   IVPB 2000 milliGRAM(s) IV Intermittent every 24 hours  enoxaparin Injectable 40 milliGRAM(s) SubCutaneous daily  hydrocortisone 1% Cream 1 Application(s) Topical daily  ibuprofen  Tablet. 400 milliGRAM(s) Oral every 6 hours  pantoprazole    Tablet 40 milliGRAM(s) Oral before breakfast    PRN MEDICATIONS  diphenhydrAMINE 25 milliGRAM(s) Oral every 4 hours PRN      VITAL SIGNS: Last 24 Hours  T(C): 36.5 (24 Jul 2021 06:51), Max: 36.5 (24 Jul 2021 06:51)  T(F): 97.7 (24 Jul 2021 06:51), Max: 97.7 (24 Jul 2021 06:51)  HR: 75 (24 Jul 2021 08:00) (72 - 77)  BP: 135/76 (24 Jul 2021 06:51) (130/74 - 135/76)  BP(mean): --  RR: 17 (24 Jul 2021 08:00) (17 - 18)  SpO2: 96% (24 Jul 2021 08:00) (96% - 96%)    LABS:                        13.0   5.42  )-----------( 330      ( 23 Jul 2021 07:18 )             39.9     07-24    141  |  105  |  17  ----------------------------<  90  4.6   |  27  |  0.8    Ca    9.5      24 Jul 2021 04:30  Mg     2.2     07-24    TPro  6.8  /  Alb  4.1  /  TBili  0.3  /  DBili  x   /  AST  29  /  ALT  75<H>  /  AlkPhos  204<H>  07-24    PT/INR - ( 24 Jul 2021 04:30 )   PT: 12.80 sec;   INR: 1.11 ratio         PTT - ( 24 Jul 2021 04:30 )  PTT:31.8 sec          Culture - Aspirate with Gram Stain (collected 22 Jul 2021 18:34)  Source: .Aspirate None  Preliminary Report (24 Jul 2021 07:39):    No growth          RADIOLOGY:  < from: CT Ankle w/ IV Cont, Right (07.21.21 @ 23:26) >  IMPRESSION:    Bimalleolar and diffuse foot subcutaneous edema, without abscess.    No acute osseous abnormality.    Large ankle joint effusion with extension into the flexor hallucis longus tendon sheath, and moderate posterior subtalar joint effusion.    < end of copied text >  < from: Xray Foot AP + Lateral + Oblique, Right (07.19.21 @ 13:46) >  Impression:  No acute fracture or dislocation.    < end of copied text >      PHYSICAL EXAM:  General: NAD, AAOx3  HEENT: atraumatic, normocephalic, EOMI, PERRLA, conjunctiva and sclera clear, No JVD  Pulmonary: clear to auscultation bilaterally; No wheeze  Cardiovascular: Regular rate and rhythm; no murmurs, rubs or gallops. Normal S1S2  Gastrointestinal: Soft, nontender, nondistended; bowel sounds present  Musculoskeletal: 2+ peripheral pulses, no clubbing, cyanosis or edema  Neurology: non-focal, Pt. alert and oriented, fluent speech, able to move all extremities  Skin: +erythema and swelling of R. lateral and medial ankle that has been improving for 2 days. Pt. denies paraesthesias with passive ROM.  +pruritic papular rash on back not improved with benadryl

## 2021-07-25 LAB
ALBUMIN SERPL ELPH-MCNC: 4.2 G/DL — SIGNIFICANT CHANGE UP (ref 3.5–5.2)
ALP SERPL-CCNC: 188 U/L — HIGH (ref 30–115)
ALT FLD-CCNC: 64 U/L — HIGH (ref 0–41)
ANA TITR SER: NEGATIVE — SIGNIFICANT CHANGE UP
ANION GAP SERPL CALC-SCNC: 12 MMOL/L — SIGNIFICANT CHANGE UP (ref 7–14)
AST SERPL-CCNC: 26 U/L — SIGNIFICANT CHANGE UP (ref 0–41)
B BURGDOR C6 AB SER-ACNC: NEGATIVE — SIGNIFICANT CHANGE UP
B BURGDOR IGG+IGM SER-ACNC: 0.4 INDEX — SIGNIFICANT CHANGE UP (ref 0.01–0.89)
BASOPHILS # BLD AUTO: 0.04 K/UL — SIGNIFICANT CHANGE UP (ref 0–0.2)
BASOPHILS NFR BLD AUTO: 0.6 % — SIGNIFICANT CHANGE UP (ref 0–1)
BILIRUB SERPL-MCNC: 0.2 MG/DL — SIGNIFICANT CHANGE UP (ref 0.2–1.2)
BUN SERPL-MCNC: 18 MG/DL — SIGNIFICANT CHANGE UP (ref 10–20)
CALCIUM SERPL-MCNC: 9.6 MG/DL — SIGNIFICANT CHANGE UP (ref 8.5–10.1)
CHLORIDE SERPL-SCNC: 103 MMOL/L — SIGNIFICANT CHANGE UP (ref 98–110)
CO2 SERPL-SCNC: 26 MMOL/L — SIGNIFICANT CHANGE UP (ref 17–32)
CREAT SERPL-MCNC: 0.8 MG/DL — SIGNIFICANT CHANGE UP (ref 0.7–1.5)
EOSINOPHIL # BLD AUTO: 0.18 K/UL — SIGNIFICANT CHANGE UP (ref 0–0.7)
EOSINOPHIL NFR BLD AUTO: 2.9 % — SIGNIFICANT CHANGE UP (ref 0–8)
GLUCOSE SERPL-MCNC: 90 MG/DL — SIGNIFICANT CHANGE UP (ref 70–99)
HAV IGM SER-ACNC: SIGNIFICANT CHANGE UP
HBV CORE IGM SER-ACNC: SIGNIFICANT CHANGE UP
HBV SURFACE AG SER-ACNC: SIGNIFICANT CHANGE UP
HCT VFR BLD CALC: 40.8 % — LOW (ref 42–52)
HCV AB S/CO SERPL IA: 0.13 S/CO — SIGNIFICANT CHANGE UP (ref 0–0.99)
HCV AB SERPL-IMP: SIGNIFICANT CHANGE UP
HGB BLD-MCNC: 13.3 G/DL — LOW (ref 14–18)
IMM GRANULOCYTES NFR BLD AUTO: 1.1 % — HIGH (ref 0.1–0.3)
LYMPHOCYTES # BLD AUTO: 1.44 K/UL — SIGNIFICANT CHANGE UP (ref 1.2–3.4)
LYMPHOCYTES # BLD AUTO: 23.2 % — SIGNIFICANT CHANGE UP (ref 20.5–51.1)
MAGNESIUM SERPL-MCNC: 2.3 MG/DL — SIGNIFICANT CHANGE UP (ref 1.8–2.4)
MCHC RBC-ENTMCNC: 28.7 PG — SIGNIFICANT CHANGE UP (ref 27–31)
MCHC RBC-ENTMCNC: 32.6 G/DL — SIGNIFICANT CHANGE UP (ref 32–37)
MCV RBC AUTO: 87.9 FL — SIGNIFICANT CHANGE UP (ref 80–94)
MONOCYTES # BLD AUTO: 0.45 K/UL — SIGNIFICANT CHANGE UP (ref 0.1–0.6)
MONOCYTES NFR BLD AUTO: 7.2 % — SIGNIFICANT CHANGE UP (ref 1.7–9.3)
NEUTROPHILS # BLD AUTO: 4.04 K/UL — SIGNIFICANT CHANGE UP (ref 1.4–6.5)
NEUTROPHILS NFR BLD AUTO: 65 % — SIGNIFICANT CHANGE UP (ref 42.2–75.2)
NRBC # BLD: 0 /100 WBCS — SIGNIFICANT CHANGE UP (ref 0–0)
PLATELET # BLD AUTO: 398 K/UL — SIGNIFICANT CHANGE UP (ref 130–400)
POTASSIUM SERPL-MCNC: 5.3 MMOL/L — HIGH (ref 3.5–5)
POTASSIUM SERPL-SCNC: 5.3 MMOL/L — HIGH (ref 3.5–5)
PROT SERPL-MCNC: 6.9 G/DL — SIGNIFICANT CHANGE UP (ref 6–8)
RBC # BLD: 4.64 M/UL — LOW (ref 4.7–6.1)
RBC # FLD: 11.8 % — SIGNIFICANT CHANGE UP (ref 11.5–14.5)
SODIUM SERPL-SCNC: 141 MMOL/L — SIGNIFICANT CHANGE UP (ref 135–146)
WBC # BLD: 6.22 K/UL — SIGNIFICANT CHANGE UP (ref 4.8–10.8)
WBC # FLD AUTO: 6.22 K/UL — SIGNIFICANT CHANGE UP (ref 4.8–10.8)

## 2021-07-25 PROCEDURE — 99233 SBSQ HOSP IP/OBS HIGH 50: CPT

## 2021-07-25 RX ORDER — SODIUM ZIRCONIUM CYCLOSILICATE 10 G/10G
10 POWDER, FOR SUSPENSION ORAL ONCE
Refills: 0 | Status: COMPLETED | OUTPATIENT
Start: 2021-07-25 | End: 2021-07-25

## 2021-07-25 RX ADMIN — CEFTRIAXONE 100 MILLIGRAM(S): 500 INJECTION, POWDER, FOR SOLUTION INTRAMUSCULAR; INTRAVENOUS at 08:44

## 2021-07-25 RX ADMIN — SODIUM ZIRCONIUM CYCLOSILICATE 10 GRAM(S): 10 POWDER, FOR SUSPENSION ORAL at 11:37

## 2021-07-25 RX ADMIN — PANTOPRAZOLE SODIUM 40 MILLIGRAM(S): 20 TABLET, DELAYED RELEASE ORAL at 05:22

## 2021-07-25 RX ADMIN — AMLODIPINE BESYLATE 5 MILLIGRAM(S): 2.5 TABLET ORAL at 05:22

## 2021-07-25 RX ADMIN — Medication 400 MILLIGRAM(S): at 05:22

## 2021-07-25 RX ADMIN — Medication 400 MILLIGRAM(S): at 23:00

## 2021-07-25 RX ADMIN — Medication 25 MILLIGRAM(S): at 22:59

## 2021-07-25 RX ADMIN — Medication 400 MILLIGRAM(S): at 11:37

## 2021-07-25 RX ADMIN — Medication 1 APPLICATION(S): at 11:38

## 2021-07-25 RX ADMIN — Medication 400 MILLIGRAM(S): at 23:18

## 2021-07-25 NOTE — PROGRESS NOTE ADULT - SUBJECTIVE AND OBJECTIVE BOX
EFRAÍN BRADFORD  50y  Male      Patient is a 50y old  Male who presents with a chief complaint of Right ankle edema, pain, erythema (24 Jul 2021 12:36)      INTERVAL HPI/OVERNIGHT EVENTS: no acute events overnight. foot feeling better.        REVIEW OF SYSTEMS:  CONSTITUTIONAL: No fever, weight loss, or fatigue  RESPIRATORY: No cough, wheezing, chills or hemoptysis; No shortness of breath  CARDIOVASCULAR: No chest pain, palpitations, dizziness, or leg swelling  GASTROINTESTINAL: No abdominal or epigastric pain. No nausea, vomiting, or hematemesis; No diarrhea or constipation. No melena or hematochezia.  GENITOURINARY: No dysuria, frequency, hematuria, or incontinence  NEUROLOGICAL: No headaches, memory loss, loss of strength, numbness, or tremors  SKIN: No itching, burning, rashes, or lesions   MUSCULOSKELETAL: No joint pain or swelling; No muscle, back, or extremity pain  PSYCHIATRIC: No depression, anxiety, mood swings, or difficulty sleeping  All other review of systems negative    VITALS  T(C): 36.5 (07-25-21 @ 06:03), Max: 36.5 (07-24-21 @ 19:46)  HR: 66 (07-25-21 @ 06:03) (66 - 94)  BP: 130/76 (07-25-21 @ 06:03) (130/76 - 136/92)  RR: 18 (07-25-21 @ 06:03) (17 - 18)  SpO2: --  Wt(kg): --Vital Signs Last 24 Hrs  T(C): 36.5 (25 Jul 2021 06:03), Max: 36.5 (24 Jul 2021 19:46)  T(F): 97.7 (25 Jul 2021 06:03), Max: 97.7 (24 Jul 2021 19:46)  HR: 66 (25 Jul 2021 06:03) (66 - 94)  BP: 130/76 (25 Jul 2021 06:03) (130/76 - 136/92)  BP(mean): --  RR: 18 (25 Jul 2021 06:03) (17 - 18)  SpO2: --      07-24-21 @ 07:01  -  07-25-21 @ 07:00  --------------------------------------------------------  IN: 50 mL / OUT: 0 mL / NET: 50 mL    07-25-21 @ 07:01  -  07-25-21 @ 09:51  --------------------------------------------------------  IN: 230 mL / OUT: 0 mL / NET: 230 mL        PHYSICAL EXAM:  GENERAL: NAD, well-groomed, well-developed  EYES: anicteric sclera, non injected conjunctiva, EOMI  ENT: hearing grossly intact, oropharyx clear, MMM  PSYCH: no agitation, baseline mentation  NERVOUS SYSTEM:  Alert & Oriented X3, Motor Strength 5/5 B/L upper and lower extremities;  PULMONARY: Clear to percussion bilaterally; No rales, rhonchi, wheezing, or rubs  CARDIOVASCULAR: Regular rate and rhythm; No murmurs, rubs, or gallops  GI: Soft, Nontender, Nondistended; Bowel sounds present  EXTREMITIES:  2+ Peripheral Pulses, No clubbing, cyanosis; faintly erythematous, poorly circumscribed area that is warm to touch on right ankle-intervally improved  LYMPH: No lymphadenopathy noted  SKIN: No rashes or lesions    Consultant(s) Notes Reviewed:  [x ] YES  [ ] NO    Discussed with Consultants/Other Providers [ x] YES     LABS                          13.3   6.22  )-----------( 398      ( 25 Jul 2021 07:18 )             40.8     07-25    141  |  103  |  18  ----------------------------<  90  5.3<H>   |  26  |  0.8    Ca    9.6      25 Jul 2021 07:18  Mg     2.3     07-25    TPro  6.9  /  Alb  4.2  /  TBili  0.2  /  DBili  x   /  AST  26  /  ALT  64<H>  /  AlkPhos  188<H>  07-25  PT/INR - ( 24 Jul 2021 04:30 )   PT: 12.80 sec;   INR: 1.11 ratio    PTT - ( 24 Jul 2021 04:30 )  PTT:31.8 sec    RADIOLOGY & ADDITIONAL TESTS:  CT Ankle w/ IV Cont, Right (07.21.21 @ 23:26)     IMPRESSION:  Bimalleolar and diffuse foot subcutaneous edema, without abscess.  No acute osseous abnormality.  Large ankle joint effusion with extension into the flexor hallucis longus tendon sheath, and moderate posterior subtalar joint effusion.      MEDICATIONS  (STANDING):  amLODIPine   Tablet 5 milliGRAM(s) Oral daily  cefTRIAXone   IVPB      cefTRIAXone   IVPB 2000 milliGRAM(s) IV Intermittent every 24 hours  enoxaparin Injectable 40 milliGRAM(s) SubCutaneous daily  ibuprofen  Tablet. 400 milliGRAM(s) Oral every 6 hours  pantoprazole    Tablet 40 milliGRAM(s) Oral before breakfast    MEDICATIONS  (PRN):  diphenhydrAMINE 25 milliGRAM(s) Oral every 4 hours PRN Rash and/or Itching

## 2021-07-25 NOTE — PROGRESS NOTE ADULT - ASSESSMENT
49 YO M with a PMH of HTN, GERD, hx of R foot drop, prior episodes of MRSA cellulitis and hx of RLL DVT (completed Apixaban course) who presents to the hospital with a c/o worsening right ankle redness over the past x 4 days. Associated with swelling/pain and fevers. Denies any trauma/falls. In the ED, XRs of RLE were negative for acute process. Dopplers of RLEs are negative. Cultures obtained, pt was started on IV ABXs.       #Cellulitis  #Septic Arthritis  # Pyotenosynovitis right extensor digitorum longus  - hx of R. foot drop and decreased sensation in R. foot. (baseline)  - hx of MRSA cellulitis; negative nare swab on this admission  - Erythema and edema of R. lateral and medial ankle with pain on active ROM -  stable with no improvement on Clinda + Vanc  - pt. denies paresthesias with passive ROM  - foot x-ray no evidence for fracture or dislocation  - pt. afebrile since 00:35 7/20  - CT demonstrating large joint effusion but no abscess collection  - s/p arthocentesis; sample was cloudy, WBC 40274 with 81% PMNx. Gm stain > PMNs, no org. Cultures p. Crystals NG  - discontinued clinda and vancomycin on 7/22, started Ceftriaxone 2g q24h for gram (-) coverage  - give motrin 400mg q6  - f/u blood cultures  - ID following; recs appreciated       F/u joint fluid cultures       ECHO       ESR/CRP       Nares ORSA       Rocephin 2 gm iv q24h    #HTN  - Start amlodipine 5mg PO daily  - d/c losartan due to hyperkalemia  - will need follow-up outpatient for BP management     #Hyperkalemia, RESOLVED  - give lokelma 10g once  - f/u K  - d/c losartan - avoid ACEi/ARBs    #transaminitis, IMPROVING  - unsure etiology; hepatocellular injury distribution  - pt. denies abdominal pain  - trend CMP  - t.bili wnl  - will consider RUQ if worsens or new sx    #GERD  - cont. protonix 40mg po daily      Dvt ppx: lovenox 40mg  PPI ppx: protonix 40mg  Diet: DASH  Activity: as tolerated    #Progress Note Handoff  Pending (specify):  ID follow up clinical improvement,   Family discussion: patient aware of plan. all questions answered. in agreement  Disposition: anticipated for tomorrow 51 YO M with a PMH of HTN, GERD, hx of R foot drop, prior episodes of MRSA cellulitis and hx of RLL DVT (completed Apixaban course) who presents to the hospital with a c/o worsening right ankle redness over the past x 4 days. Associated with swelling/pain and fevers. Denies any trauma/falls. In the ED, XRs of RLE were negative for acute process. Dopplers of RLEs are negative. Cultures obtained, pt was started on IV ABXs.       #Cellulitis  #Septic Arthritis  # Pyotenosynovitis right extensor digitorum longus  - hx of R. foot drop and decreased sensation in R. foot. (baseline)  - hx of MRSA cellulitis; negative nare swab on this admission  - Erythema and edema of R. lateral and medial ankle with pain on active ROM -  stable with no improvement on Clinda + Vanc  - pt. denies paresthesias with passive ROM  - foot x-ray no evidence for fracture or dislocation  - pt. afebrile since 00:35 7/20  - CT demonstrating large joint effusion but no abscess collection  - s/p arthocentesis; sample was cloudy, WBC 76089 with 81% PMNx. Gm stain > PMNs, no org. Cultures p. Crystals NG  - discontinued clinda and vancomycin on 7/22, started Ceftriaxone 2g q24h for gram (-) coverage  - give motrin 400mg q6  - f/u blood cultures  - ID following; recs appreciated       F/u joint fluid cultures       ECHO       ESR/CRP       Nares ORSA       Rocephin 2 gm iv q24h    #HTN  - Start amlodipine 5mg PO daily  - d/c losartan due to hyperkalemia  - will need follow-up outpatient for BP management     #Hyperkalemia, RESOLVED  - give lokelma 10g once  - f/u K  - d/c losartan - avoid ACEi/ARBs    #transaminitis, IMPROVING  - unsure etiology; hepatocellular injury distribution  - pt. denies abdominal pain  - trend CMP  - t.bili wnl  - will consider RUQ if worsens or new sx    #GERD  - cont. protonix 40mg po daily      Dvt ppx: lovenox 40mg  PPI ppx: protonix 40mg  Diet: DASH  Activity: as tolerated    #Progress Note Handoff  Pending (specify):  ID follow up clinical improvement,   Family discussion: patient aware of plan. all questions answered. in agreement  Disposition: anticipated for tomorrow. ID to follow up today for final recs.

## 2021-07-25 NOTE — PHYSICAL THERAPY INITIAL EVALUATION ADULT - PERTINENT HX OF CURRENT PROBLEM, REHAB EVAL
51 YO M with a PMH of HTN, GERD, hx of R foot drop, prior episodes of MRSA cellulitis and hx of RLL DVT (completed Apixaban course) who presents to the hospital with a c/o worsening right ankle redness,  Associated with swelling/pain and fevers. negative dopplers, cultures done, started on antibiotics, s/p Arthrocentesis 7/22.

## 2021-07-25 NOTE — PHYSICAL THERAPY INITIAL EVALUATION ADULT - GENERAL OBSERVATIONS, REHAB EVAL
Chart reviewed, no activity orders updated yet, unable to attempt IE. Will f/u once appropriate OOB orders are in place.
Pt currently without activity orders. Attempted to call MD (listed Flavio [phone]) however no answer at this time. Will hold b/s PT IE until activity orders have been placed, if appropriate.
Chart reviewed, pt encountered seated at eob, NAD, spouse present b/s, both frustrated reports wanted to go sit outside for sometime, but RN on break, covering RN assisting another pt, and available RN wasn't able to address their need.  PT spoke with covering RN, no concerns reported re: pt spending time outside, PT agreed to assist pt with directions if safely ambulating. IE completed 14:40-15:00, pt independent with all functional mob, PT assisted pt & spouse to front entrance, informed to check with security for exit and re enter. No Acute Rehab services required at this time, pt verbalizes understanding, reconsult if status changes.

## 2021-07-26 ENCOUNTER — TRANSCRIPTION ENCOUNTER (OUTPATIENT)
Age: 51
End: 2021-07-26

## 2021-07-26 VITALS
DIASTOLIC BLOOD PRESSURE: 77 MMHG | RESPIRATION RATE: 18 BRPM | HEART RATE: 74 BPM | SYSTOLIC BLOOD PRESSURE: 133 MMHG | TEMPERATURE: 97 F

## 2021-07-26 LAB
ALBUMIN SERPL ELPH-MCNC: 4.2 G/DL — SIGNIFICANT CHANGE UP (ref 3.5–5.2)
ALP SERPL-CCNC: 169 U/L — HIGH (ref 30–115)
ALT FLD-CCNC: 52 U/L — HIGH (ref 0–41)
ANION GAP SERPL CALC-SCNC: 12 MMOL/L — SIGNIFICANT CHANGE UP (ref 7–14)
AST SERPL-CCNC: 23 U/L — SIGNIFICANT CHANGE UP (ref 0–41)
BASOPHILS # BLD AUTO: 0.05 K/UL — SIGNIFICANT CHANGE UP (ref 0–0.2)
BASOPHILS NFR BLD AUTO: 0.7 % — SIGNIFICANT CHANGE UP (ref 0–1)
BILIRUB SERPL-MCNC: 0.3 MG/DL — SIGNIFICANT CHANGE UP (ref 0.2–1.2)
BUN SERPL-MCNC: 21 MG/DL — HIGH (ref 10–20)
CALCIUM SERPL-MCNC: 9.2 MG/DL — SIGNIFICANT CHANGE UP (ref 8.5–10.1)
CHLORIDE SERPL-SCNC: 103 MMOL/L — SIGNIFICANT CHANGE UP (ref 98–110)
CO2 SERPL-SCNC: 26 MMOL/L — SIGNIFICANT CHANGE UP (ref 17–32)
CREAT SERPL-MCNC: 0.9 MG/DL — SIGNIFICANT CHANGE UP (ref 0.7–1.5)
CULTURE RESULTS: SIGNIFICANT CHANGE UP
EOSINOPHIL # BLD AUTO: 0.16 K/UL — SIGNIFICANT CHANGE UP (ref 0–0.7)
EOSINOPHIL NFR BLD AUTO: 2.2 % — SIGNIFICANT CHANGE UP (ref 0–8)
GLUCOSE SERPL-MCNC: 78 MG/DL — SIGNIFICANT CHANGE UP (ref 70–99)
HCT VFR BLD CALC: 41.2 % — LOW (ref 42–52)
HGB BLD-MCNC: 13.5 G/DL — LOW (ref 14–18)
IMM GRANULOCYTES NFR BLD AUTO: 1.1 % — HIGH (ref 0.1–0.3)
LYMPHOCYTES # BLD AUTO: 1.79 K/UL — SIGNIFICANT CHANGE UP (ref 1.2–3.4)
LYMPHOCYTES # BLD AUTO: 24.2 % — SIGNIFICANT CHANGE UP (ref 20.5–51.1)
MAGNESIUM SERPL-MCNC: 2.3 MG/DL — SIGNIFICANT CHANGE UP (ref 1.8–2.4)
MCHC RBC-ENTMCNC: 29.2 PG — SIGNIFICANT CHANGE UP (ref 27–31)
MCHC RBC-ENTMCNC: 32.8 G/DL — SIGNIFICANT CHANGE UP (ref 32–37)
MCV RBC AUTO: 89 FL — SIGNIFICANT CHANGE UP (ref 80–94)
MONOCYTES # BLD AUTO: 0.53 K/UL — SIGNIFICANT CHANGE UP (ref 0.1–0.6)
MONOCYTES NFR BLD AUTO: 7.2 % — SIGNIFICANT CHANGE UP (ref 1.7–9.3)
NEUTROPHILS # BLD AUTO: 4.79 K/UL — SIGNIFICANT CHANGE UP (ref 1.4–6.5)
NEUTROPHILS NFR BLD AUTO: 64.6 % — SIGNIFICANT CHANGE UP (ref 42.2–75.2)
NRBC # BLD: 0 /100 WBCS — SIGNIFICANT CHANGE UP (ref 0–0)
PLATELET # BLD AUTO: 430 K/UL — HIGH (ref 130–400)
POTASSIUM SERPL-MCNC: 4.9 MMOL/L — SIGNIFICANT CHANGE UP (ref 3.5–5)
POTASSIUM SERPL-SCNC: 4.9 MMOL/L — SIGNIFICANT CHANGE UP (ref 3.5–5)
PROT SERPL-MCNC: 7 G/DL — SIGNIFICANT CHANGE UP (ref 6–8)
RBC # BLD: 4.63 M/UL — LOW (ref 4.7–6.1)
RBC # FLD: 11.7 % — SIGNIFICANT CHANGE UP (ref 11.5–14.5)
SODIUM SERPL-SCNC: 141 MMOL/L — SIGNIFICANT CHANGE UP (ref 135–146)
SPECIMEN SOURCE: SIGNIFICANT CHANGE UP
WBC # BLD: 7.4 K/UL — SIGNIFICANT CHANGE UP (ref 4.8–10.8)
WBC # FLD AUTO: 7.4 K/UL — SIGNIFICANT CHANGE UP (ref 4.8–10.8)

## 2021-07-26 PROCEDURE — 99239 HOSP IP/OBS DSCHRG MGMT >30: CPT

## 2021-07-26 RX ORDER — OLMESARTAN MEDOXOMIL 5 MG/1
1 TABLET, FILM COATED ORAL
Qty: 0 | Refills: 0 | DISCHARGE

## 2021-07-26 RX ORDER — AMLODIPINE BESYLATE 2.5 MG/1
1 TABLET ORAL
Qty: 30 | Refills: 0
Start: 2021-07-26 | End: 2021-08-24

## 2021-07-26 RX ORDER — CEPHALEXIN 500 MG
1 CAPSULE ORAL
Qty: 140 | Refills: 0
Start: 2021-07-26 | End: 2021-08-29

## 2021-07-26 RX ADMIN — AMLODIPINE BESYLATE 5 MILLIGRAM(S): 2.5 TABLET ORAL at 05:37

## 2021-07-26 RX ADMIN — Medication 400 MILLIGRAM(S): at 07:18

## 2021-07-26 RX ADMIN — CEFTRIAXONE 100 MILLIGRAM(S): 500 INJECTION, POWDER, FOR SOLUTION INTRAMUSCULAR; INTRAVENOUS at 11:31

## 2021-07-26 RX ADMIN — Medication 400 MILLIGRAM(S): at 05:37

## 2021-07-26 RX ADMIN — PANTOPRAZOLE SODIUM 40 MILLIGRAM(S): 20 TABLET, DELAYED RELEASE ORAL at 07:19

## 2021-07-26 NOTE — PROGRESS NOTE ADULT - SUBJECTIVE AND OBJECTIVE BOX
ORTHOPEDIC SURGERY PROGRESS NOTE    SUBJECTIVE: Patient seen and examined. Pain controlled.  Pt did well o/n  No f/c/n/v/cp/sob.     OBJECTIVE:  NAD  Vital Signs Last 24 Hrs  T(C): 35.9 (26 Jul 2021 05:03), Max: 36.8 (25 Jul 2021 21:31)  T(F): 96.6 (26 Jul 2021 05:03), Max: 98.3 (25 Jul 2021 21:31)  HR: 74 (26 Jul 2021 05:03) (74 - 80)  BP: 133/77 (26 Jul 2021 05:03) (133/77 - 155/82)  BP(mean): --  RR: 18 (26 Jul 2021 05:03) (18 - 18)  SpO2: --    Affected extremity:   RLE  Mild cellulitis / erythema over ankle, improving  Mild TTP, improving  SILT sp/dp/t/sural/saph  Firing ta/ehl/fhl/gs  Foot WWP, 2+ DP pulse                        13.5   7.40  )-----------( 430      ( 26 Jul 2021 04:30 )             41.2     07-26    141  |  103  |  21<H>  ----------------------------<  78  4.9   |  26  |  0.9    Ca    9.2      26 Jul 2021 04:30  Mg     2.3     07-26    TPro  7.0  /  Alb  4.2  /  TBili  0.3  /  DBili  x   /  AST  23  /  ALT  52<H>  /  AlkPhos  169<H>  07-26    A/P :  50yMale with right ankle pain, swelling, patches of erythematous skin changes, ortho consulted for rule out right ankle septic joint.     -no orthopedic intervention at this time  -Arthrocentesis results: WBC 30k with numerous RBC, no crystals, neg gram stain, neg cx - no indication of infection  -Ortho to sign off.  -Please reconsult PRN

## 2021-07-26 NOTE — DISCHARGE NOTE PROVIDER - HOSPITAL COURSE
Mr. Luna is a 49 y/o male with PMHx of HTN, GERD, R. foot drop, MRSA cellulitis, and provoked RLL DVT adequately treated wiht apixaban who presented to the hospital for worsening R. ankle redness for 4 days. Swelling was associated with swelling, pain and fevers. He denies any trama, but is a  and due to his R. foot drop may have hit something without notice. He deniesd paresthesias, focal weakness, nausea and vomiting. In the ED he was found to be febrile with negative x-rays of R. ankle. He was given one dose of cefazolin in the ED and switched to clindamycin on admission. On the first day of admission pt had improvement of his symptoms. The following day his symptoms progressed, MRSA nares were negative, and CT ankle revealed large ankle joint effusion. Arthrocentesis revealed bloody fluid that was negative for organisms. ID was consulted and ceftrixone was started. Pt. is responding well on ceftriaxone with improvement in erythema, pain and weight bearing. Per ID patient can be discharged on keflex for 5 weeks startin 7/27

## 2021-07-26 NOTE — PROGRESS NOTE ADULT - REASON FOR ADMISSION
Right ankle edema, pain, erythema

## 2021-07-26 NOTE — PROGRESS NOTE ADULT - EXTREMITIES COMMENTS
F foot with almost resolved edema, resolved erythema. Left foot  flexion/extension  with no pain. Pro/supination unremarkable

## 2021-07-26 NOTE — PROGRESS NOTE ADULT - PROVIDER SPECIALTY LIST ADULT
Hospitalist
Internal Medicine
Internal Medicine
Hospitalist
Internal Medicine
Internal Medicine
Orthopedics
Orthopedics
Hospitalist
Internal Medicine
Internal Medicine
Infectious Disease

## 2021-07-26 NOTE — PROGRESS NOTE ADULT - ASSESSMENT
ADMISSION SUMMARY  51 YO M with a PMH of HTN, GERD, hx of R foot drop, prior episodes of MRSA cellulitis and hx of RLL DVT (completed Apixaban course) who presents to the hospital with a c/o worsening right ankle redness over the past x 4 days. Associated with swelling/pain and fevers. Denies any trauma/falls. In the ED, XRs of RLE were negative for acute process. Dopplers of RLEs are negative. Cultures obtained, pt was started on IV ABXs.       #Cellulitis v. aseptic arthritis  - hx of R. foot drop and decreased sensation in R. foot.   - hx of MRSA cellulitis  - MRSA nares neg  - ESR on admission wnl (2) -> raised to 67 on 7/23  - pt. afebrile since 00:35 7/20  - x-ray foot no evidence for fracture or dislocation  - CT revealed large ankle joint effusion with extension into the flexor hallucis longus and subtalar joint effusion  - S/p arthrocentesis by Ortho 7/22  - Fluid - cloudy with 30k cells and 80% segmented granulocytes  - Gram stain neg for organisms with many WBCs  - Crystals neg  - Echo neg for vegetations - mild tricuspid regurgitation pt. reports hx of murmur followed by PCP  - Lyme, BRET, RF, C3 neg  - ID recs appreciated  - joint fluid cultures - NGTD  - discharge on cephalexin 500mg q6 for 5 weeks starting 7/27  - cont. motrin 400mg q6 for pain    #HTN  - cont. amlodipine 5mg PO daily  - d/c losartan due to hyperkalemia  - will need follow-up outpatient for BP management     #Hyperkalemia - resolved  - avoid ACEi/ARBs     #transaminitis - improving  - pt. denies abdominal pain  - reports excessive use of acetaminophen prior to admission  - LFT stable  - will need follow-up with GI outpatient  - trend CMP    #GERD  - cont. protonix 40mg po daily    Dvt ppx: lovenox 40mg  PPI ppx: protonix 40mg  Diet: DASH  Dispo:  discharge to home today 7/26  Activity: as tolerated   ADMISSION SUMMARY  49 YO M with a PMH of HTN, GERD, hx of R foot drop, prior episodes of MRSA cellulitis and hx of RLL DVT (completed Apixaban course) who presents to the hospital with a c/o worsening right ankle redness over the past x 4 days. Associated with swelling/pain and fevers. Denies any trauma/falls. In the ED, XRs of RLE were negative for acute process. Dopplers of RLEs are negative. Cultures obtained, pt was started on IV ABXs.       #Cellulitis v. aseptic arthritis  - hx of R. foot drop and decreased sensation in R. foot.   - hx of MRSA cellulitis  - MRSA nares neg  - ESR on admission wnl (2) -> raised to 67 on 7/23  - pt. afebrile since 00:35 7/20  - x-ray foot no evidence for fracture or dislocation  - CT revealed large ankle joint effusion with extension into the flexor hallucis longus and subtalar joint effusion  - S/p arthrocentesis by Ortho 7/22  - Fluid - cloudy with 30k cells and 80% segmented granulocytes  - Gram stain neg for organisms with many WBCs  - Crystals neg  - Echo neg for vegetations - mild tricuspid regurgitation pt. reports hx of murmur followed by PCP  - Lyme, BRET, RF, C3 neg  - ID recs appreciated  - joint fluid cultures - NGTD  - discharge on cephalexin 500mg q6 for 5 weeks starting 7/27  - discharge on probenecid 500mg q12  - cont. motrin 400mg q6 for pain    #HTN  - cont. amlodipine 5mg PO daily  - d/c losartan due to hyperkalemia  - will need follow-up outpatient for BP management     #Hyperkalemia - resolved  - avoid ACEi/ARBs     #transaminitis - improving  - pt. denies abdominal pain  - reports excessive use of acetaminophen prior to admission  - LFT stable  - will need follow-up with GI outpatient  - trend CMP    #GERD  - cont. protonix 40mg po daily    Dvt ppx: lovenox 40mg  PPI ppx: protonix 40mg  Diet: DASH  Dispo:  discharge to home today 7/26  Activity: as tolerated

## 2021-07-26 NOTE — DISCHARGE NOTE PROVIDER - CARE PROVIDER_API CALL
Eren Costa  [address] [address]  Phone: [phone]  Fax: [phone]  Follow Up Time: 2 weeks

## 2021-07-26 NOTE — DISCHARGE NOTE PROVIDER - NSDCMRMEDTOKEN_GEN_ALL_CORE_FT
amLODIPine 5 mg oral tablet: 1 tab(s) orally once a day  Keflex 500 mg oral capsule: 1 cap(s) orally every 6 hours   NexIUM 40 mg oral delayed release capsule: 1 cap(s) orally once a day  probenecid 500 mg oral tablet: 1 tab(s) orally every 12 hours

## 2021-07-26 NOTE — PROGRESS NOTE ADULT - SUBJECTIVE AND OBJECTIVE BOX
EFRAÍN BRADFORD 50y Male  MRN#: [id number]   CODE STATUS: FULL      SUBJECTIVE  Patient is a 50y old Male who presents with a chief complaint of Right ankle edema, pain, erythema (26 Jul 2021 09:59)  Currently admitted to medicine with the primary diagnosis of Cellulitis    Hospital course has been complicated by septic arthritis  Today is hospital day 7d, and this morning he is resting in bed comfortably and reports no overnight events.     Present Today:           Riojas Catheter (x)No/ ()Yes? Indication:          Central Line (x)No/ ()Yes? Indication:          IV Fluids (x)No/ ()Yes? Type:  Rate:  Indication:      OBJECTIVE  PAST MEDICAL & SURGICAL HISTORY  HTN (hypertension)    Acid reflux      Home Meds:  amLODIPine 5 mg oral tablet: 1 tab(s) orally once a day  Keflex 500 mg oral capsule: 1 cap(s) orally every 6 hours   NexIUM 40 mg oral delayed release capsule: 1 cap(s) orally once a day  probenecid 500 mg oral tablet: 1 tab(s) orally every 12 hours     ALLERGIES:  No Known Allergies    MEDICATIONS:  STANDING MEDICATIONS  amLODIPine   Tablet 5 milliGRAM(s) Oral daily  cefTRIAXone   IVPB      cefTRIAXone   IVPB 2000 milliGRAM(s) IV Intermittent every 24 hours  enoxaparin Injectable 40 milliGRAM(s) SubCutaneous daily  hydrocortisone 1% Cream 1 Application(s) Topical daily  ibuprofen  Tablet. 400 milliGRAM(s) Oral every 6 hours  pantoprazole    Tablet 40 milliGRAM(s) Oral before breakfast    PRN MEDICATIONS  diphenhydrAMINE 25 milliGRAM(s) Oral every 4 hours PRN      VITAL SIGNS: Last 24 Hours  T(C): 35.9 (26 Jul 2021 05:03), Max: 36.8 (25 Jul 2021 21:31)  T(F): 96.6 (26 Jul 2021 05:03), Max: 98.3 (25 Jul 2021 21:31)  HR: 74 (26 Jul 2021 05:03) (74 - 80)  BP: 133/77 (26 Jul 2021 05:03) (122/69 - 155/82)  BP(mean): --  RR: 18 (26 Jul 2021 05:03) (18 - 18)  SpO2: --    LABS:                        13.5   7.40  )-----------( 430      ( 26 Jul 2021 04:30 )             41.2     07-26    141  |  103  |  21<H>  ----------------------------<  78  4.9   |  26  |  0.9    Ca    9.2      26 Jul 2021 04:30  Mg     2.3     07-26    TPro  7.0  /  Alb  4.2  /  TBili  0.3  /  DBili  x   /  AST  23  /  ALT  52<H>  /  AlkPhos  169<H>  07-26                  RADIOLOGY:    < from: TTE Echo Complete w/o Contrast w/ Doppler (07.23.21 @ 15:55) >  Summary:   1. LV Ejection Fraction by Pham's Method with a biplane EF of 60 %.   2. Normal left ventricular size and wall thicknesses, with normal systolic and diastolic function.   3. Mild tricuspid regurgitation.    < end of copied text >    < from: CT Ankle w/ IV Cont, Right (07.21.21 @ 23:26) >  IMPRESSION:    Bimalleolar and diffuse foot subcutaneous edema, without abscess.    No acute osseous abnormality.    Large ankle joint effusion with extension into the flexor hallucis longus tendon sheath, and moderate posterior subtalar joint effusion.    --- End of Report ---    < end of copied text >      PHYSICAL EXAM:  General: NAD, AAOx3  HEENT: atraumatic, normocephalic, EOMI, PERRLA, conjunctiva and sclera clear, No JVD  Pulmonary: clear to auscultation bilaterally; No wheeze  Cardiovascular: Regular rate and rhythm; no murmurs, rubs or gallops. Normal S1S2  Gastrointestinal: Soft, nontender, nondistended; bowel sounds present  Musculoskeletal: 2+ peripheral pulses, no clubbing, cyanosis or edema  Neurology: non-focal, Pt. alert and oriented, fluent speech, able to move all extremities  Skin:  +erythema and swelling of R. lateral and medial ankle that is significantly improved. Pt. denies paraesthesias with passive ROM.

## 2021-07-26 NOTE — PROGRESS NOTE ADULT - NSICDXPILOT_GEN_ALL_CORE
[place]

## 2021-07-26 NOTE — DISCHARGE NOTE NURSING/CASE MANAGEMENT/SOCIAL WORK - PATIENT PORTAL LINK FT
You can access the FollowMyHealth Patient Portal offered by [facility] by registering at the following website: [email or web address]. By joining [employer]’s FollowMyHealth portal, you will also be able to view your health information using other applications (apps) compatible with our system.

## 2021-07-26 NOTE — PROGRESS NOTE ADULT - ASSESSMENT
· Assessment	  49 YO M with a PMH of HTN, GERD, hx of R foot drop, prior episodes of MRSA cellulitis and hx of RLL DVT (completed Apixaban course) who presents to the hospital with a c/o worsening right ankle redness over the past x 4 days. Associated with swelling/pain and fevers.    IMPRESSION;  Doubt septic arthritis right ankle> would not expect this dramatic resolution  Pyotenosynovitis right extensor digitorum longus> resolved  Has overlying cellulitis more laterally but also medially> resolved  7/22 Arthrocentesis : WBC 49705 with 81% PMNx. Gm stain > PMNs, no org. Cultures NG. Crystals NG  No evidence of inflammatory synovitis  R/o endocarditis  7/20 BCx NG    RECOMMENDATIONS;  PO Keflex 500 mg q6h  Po Probenecid 500 mg q12h  Duration 5 more weeks starting 7/27  recall prn please

## 2021-07-26 NOTE — PROGRESS NOTE ADULT - SUBJECTIVE AND OBJECTIVE BOX
EFRAÍN BRADFORD  50y, Male    All available historical data reviewed    OVERNIGHT EVENTS:    no fevers  feels well and has no complaints   ROS:  General: Denies rigors, nightsweats  HEENT: Denies headache, rhinorrhea, sore throat, eye pain  CV: Denies CP, palpitations  PULM: Denies wheezing, hemoptysis  GI: Denies hematemesis, hematochezia, melena  : Denies discharge, hematuria  MSK: Denies arthralgias, myalgias  SKIN: Denies rash, lesions  NEURO: Denies paresthesias, weakness  PSYCH: Denies depression, anxiety    VITALS:  T(F): 96.6, Max: 98.3 (07-25-21 @ 21:31)  HR: 74  BP: 133/77  RR: 18Vital Signs Last 24 Hrs  T(C): 35.9 (26 Jul 2021 05:03), Max: 36.8 (25 Jul 2021 21:31)  T(F): 96.6 (26 Jul 2021 05:03), Max: 98.3 (25 Jul 2021 21:31)  HR: 74 (26 Jul 2021 05:03) (74 - 80)  BP: 133/77 (26 Jul 2021 05:03) (122/69 - 155/82)  BP(mean): --  RR: 18 (26 Jul 2021 05:03) (18 - 18)  SpO2: --    TESTS & MEASUREMENTS:                        13.3   6.22  )-----------( 398      ( 25 Jul 2021 07:18 )             40.8     07-25    141  |  103  |  18  ----------------------------<  90  5.3<H>   |  26  |  0.8    Ca    9.6      25 Jul 2021 07:18  Mg     2.3     07-25    TPro  6.9  /  Alb  4.2  /  TBili  0.2  /  DBili  x   /  AST  26  /  ALT  64<H>  /  AlkPhos  188<H>  07-25    LIVER FUNCTIONS - ( 25 Jul 2021 07:18 )  Alb: 4.2 g/dL / Pro: 6.9 g/dL / ALK PHOS: 188 U/L / ALT: 64 U/L / AST: 26 U/L / GGT: x             Culture - Aspirate with Gram Stain (collected 07-22-21 @ 18:34)  Source: .Aspirate None  Preliminary Report (07-24-21 @ 07:39):    No growth    Culture - Blood (collected 07-20-21 @ 16:00)  Source: .Blood Blood  Final Report (07-26-21 @ 02:00):    No Growth Final            RADIOLOGY & ADDITIONAL TESTS:  Personal review of radiological diagnostics performed  Echo and EKG results noted when applicable.     MEDICATIONS:  amLODIPine   Tablet 5 milliGRAM(s) Oral daily  cefTRIAXone   IVPB      cefTRIAXone   IVPB 2000 milliGRAM(s) IV Intermittent every 24 hours  diphenhydrAMINE 25 milliGRAM(s) Oral every 4 hours PRN  enoxaparin Injectable 40 milliGRAM(s) SubCutaneous daily  hydrocortisone 1% Cream 1 Application(s) Topical daily  ibuprofen  Tablet. 400 milliGRAM(s) Oral every 6 hours  pantoprazole    Tablet 40 milliGRAM(s) Oral before breakfast      ANTIBIOTICS:  cefTRIAXone   IVPB      cefTRIAXone   IVPB 2000 milliGRAM(s) IV Intermittent every 24 hours

## 2021-07-26 NOTE — DISCHARGE NOTE PROVIDER - NSDCCPCAREPLAN_GEN_ALL_CORE_FT
PRINCIPAL DISCHARGE DIAGNOSIS  Diagnosis: Cellulitis  Assessment and Plan of Treatment: Take the prescribed antibiotic medicine you are given as directed until it is gone. Take it even if you feel better. It treats the infection and stops it from  Not taking all the medicine can make future infections hard to treat. Keep the infected area clean. When possible, raise the infected area above the level of your heart. This helps keep swelling down. Apply clean bandages as advised. Wash your hands often to prevent spreading the infection. In the future, wash your hands before and after you touch cuts, scratches, or bandages. This will help prevent infection.   We are starting you on long term antibiotics for fear of septic arthrtitis. This could not be confirmed on fluid cultures because your were started on antibiotics before the drainage of the fluid. Please complete full course of antibiotics.   Call your doctor or returnt o the emergency room or call 911 immediately if you have any of the following: Difficulty or pain when moving the joints above or below the infected area, Discharge or pus draining from the area, rever of 100.4°F (38°C) or higher, or as directed by your healthcare provider, pain that gets worse in or around the infected site, redness that gets worse in or around the infected area, particularly if the area of redness expands to a wider area, shaking chills, swelling of the infected area, vomiting.

## 2021-07-27 LAB — C3 SERPL-MCNC: 233 MG/DL — HIGH (ref 81–157)

## 2021-07-28 LAB — B BURGDOR DNA SPEC QL NAA+PROBE: NEGATIVE — SIGNIFICANT CHANGE UP

## 2021-08-05 DIAGNOSIS — M25.571 PAIN IN RIGHT ANKLE AND JOINTS OF RIGHT FOOT: ICD-10-CM

## 2021-08-05 DIAGNOSIS — M65.871 OTHER SYNOVITIS AND TENOSYNOVITIS, RIGHT ANKLE AND FOOT: ICD-10-CM

## 2021-08-05 DIAGNOSIS — R74.01 ELEVATION OF LEVELS OF LIVER TRANSAMINASE LEVELS: ICD-10-CM

## 2021-08-05 DIAGNOSIS — Z86.718 PERSONAL HISTORY OF OTHER VENOUS THROMBOSIS AND EMBOLISM: ICD-10-CM

## 2021-08-05 DIAGNOSIS — Z86.14 PERSONAL HISTORY OF METHICILLIN RESISTANT STAPHYLOCOCCUS AUREUS INFECTION: ICD-10-CM

## 2021-08-05 DIAGNOSIS — E87.5 HYPERKALEMIA: ICD-10-CM

## 2021-08-05 DIAGNOSIS — I10 ESSENTIAL (PRIMARY) HYPERTENSION: ICD-10-CM

## 2021-08-05 DIAGNOSIS — M00.9 PYOGENIC ARTHRITIS, UNSPECIFIED: ICD-10-CM

## 2021-08-05 DIAGNOSIS — M25.471 EFFUSION, RIGHT ANKLE: ICD-10-CM

## 2021-08-05 DIAGNOSIS — L03.115 CELLULITIS OF RIGHT LOWER LIMB: ICD-10-CM

## 2021-08-05 DIAGNOSIS — M21.371 FOOT DROP, RIGHT FOOT: ICD-10-CM

## 2021-08-05 DIAGNOSIS — R21 RASH AND OTHER NONSPECIFIC SKIN ERUPTION: ICD-10-CM

## 2021-08-05 DIAGNOSIS — R26.2 DIFFICULTY IN WALKING, NOT ELSEWHERE CLASSIFIED: ICD-10-CM

## 2021-08-05 DIAGNOSIS — K21.9 GASTRO-ESOPHAGEAL REFLUX DISEASE WITHOUT ESOPHAGITIS: ICD-10-CM

## 2021-08-06 LAB
CULTURE RESULTS: NO GROWTH — SIGNIFICANT CHANGE UP
SPECIMEN SOURCE: SIGNIFICANT CHANGE UP

## 2021-08-13 ENCOUNTER — TRANSCRIPTION ENCOUNTER (OUTPATIENT)
Age: 51
End: 2021-08-13

## 2022-02-23 NOTE — PRE-ANESTHESIA EVALUATION ADULT - HEIGHT IN FEET
[facility] Emergency Dept  [facility] Medicine  History & Physical    Patient Name: Maggy Tapia  MRN: [id number]  Patient Class: OP- Observation  Admission Date: 2/22/2022  Attending Physician: Juliann Rodrigez MD  Primary Care Provider: Yanna Abbasi MD         Patient information was obtained from patient, relative(s), past medical records and ER records.     Subjective:     Principal Problem:Stroke    Chief Complaint:   Chief Complaint   Patient presents with    Altered Mental Status     Confusion since this AM; pt is oriented x 2 (place & person, not time); lives with brother who noticed confusion this AM; afebrile        HPI: Patient is a 73-year-old female with a history of 2 strokes, type 2 diabetes, hypertension, hyperlipidemia, pseudoseizures who was seen today with altered mental status.  History is provided by patient and supplemented by her brother as patient remains confused.  Patient lives with brother.  She was her usual self yesterday evening when she went to sleep.  Patient reports feeling more fatigued this morning.  She is unable to recall the events of the day.  Her brother reports he came home today and saw her eating dry cereal 1 piece at the time at the table which is unusual for her.  He reports she also had 2 episodes of pseudoseizures (his words, states they are not real seizures and she has seen a neurologist for than before, states she is not on antiepileptics at this time) where she slumped over and was unresponsive for few seconds before becoming alert again.  She denies any complaints at this time.  Brother states she had a headache earlier today.  Workup in the emergency department has been mostly unremarkable including basic blood work and CT head.  Urinalysis negative for infection.  Patient will be monitored by the hospital medicine service and will out stroke with MRI.  Will consult with Neurology.      Past Medical History:   Diagnosis Date    Anxiety     Arthritis     Asthma      Cancer 2000    Left Breast    Depression     Diabetes mellitus, type 2     GERD (gastroesophageal reflux disease)     Hyperlipidemia     Hypertension     Overactive bladder     Seizures     Pseudo-seizures    Sleep apnea     Stroke     Thyroid disease     Urinary tract infection without hematuria 8/3/2017       Past Surgical History:   Procedure Laterality Date    APPENDECTOMY      BREAST BIOPSY Left     BREAST LUMPECTOMY Left     2016    BREAST SURGERY      CATARACT EXTRACTION Bilateral     OU done//     SECTION      CHOLECYSTECTOMY      COLONOSCOPY N/A 2020    Procedure: COLONOSCOPY;  Surgeon: Mj Fernandez MD;  Location[facility];  Service: Endoscopy;  Laterality: N/A;    CYST REMOVAL Left 2021    DILATION AND CURETTAGE OF UTERUS      EYE SURGERY         Review of patient's allergies indicates:   Allergen Reactions    Penicillins Anaphylaxis    Sulfa (sulfonamide antibiotics) Anaphylaxis    Trintellix [vortioxetine] Nausea And Vomiting and Other (See Comments)     Patient has seizures and vomits       No current facility-administered medications on file prior to encounter.     Current Outpatient Medications on File Prior to Encounter   Medication Sig    albuterol (ACCUNEB) 1.25 mg/3 mL Nebu Take 3 mLs (1.25 mg total) by nebulization every 6 (six) hours as needed (sob). Rescue    aspirin (ECOTRIN) 81 MG EC tablet Take 81 mg by mouth once daily.    blood-glucose meter kit Use as instructed. Insurance preferred.    CALCIUM CARBONATE/VITAMIN D3 (CALCIUM 500 + D ORAL) Take 1 tablet by mouth once daily. 10 mg daily    empagliflozin (JARDIANCE) 10 mg tablet Take 1 tablet (10 mg total) by mouth once daily.    fluticasone furoate-vilanteroL (BREO ELLIPTA) 100-25 mcg/dose diskus inhaler Inhale 1 puff into the lungs once daily. Controller    gabapentin (NEURONTIN) 300 MG capsule Take 1 tablet every night x 7 days. Increase to twice a day x 7 days. Increase to  three times a day.    levothyroxine (SYNTHROID) 100 MCG tablet Take 1 tablet (100 mcg total) by mouth before breakfast.    losartan (COZAAR) 50 MG tablet Take 1 tablet (50 mg total) by mouth once daily.    metFORMIN (GLUCOPHAGE-XR) 500 MG ER 24hr tablet Take 2 tablets (1,000 mg total) by mouth 2 (two) times daily with meals.    potassium chloride SA (K-DUR,KLOR-CON) 20 MEQ tablet Take 20 mEq by mouth once daily.    prazosin (MINIPRESS) 5 MG capsule Take 1 capsule (5 mg total) by mouth every evening.    sertraline (ZOLOFT) 25 MG tablet Take 1 tablet (25 mg total) by mouth once daily.    simvastatin (ZOCOR) 40 MG tablet TAKE 1 TABLET BY MOUTH EVERY DAY    ergocalciferol (ERGOCALCIFEROL) 50,000 unit Cap Take 1 capsule (50,000 Units total) by mouth every 7 days.    gabapentin (NEURONTIN) 100 MG capsule Take 1 capsule (100 mg total) by mouth 3 (three) times daily.    HYDROcodone-acetaminophen (NORCO) 5-325 mg per tablet Take 1 tablet by mouth every 6 (six) hours as needed for Pain.    nystatin (MYCOSTATIN) powder Apply topically 2 (two) times daily. for 14 days    solifenacin (VESICARE) 10 MG tablet Take 1 tablet (10 mg total) by mouth once daily.    [DISCONTINUED] thiamine 100 MG tablet Take 100 mg by mouth once daily.     Family History       Problem Relation (Age of Onset)    Breast cancer Mother    Cataracts Mother, Brother    Diabetes Mother    Heart failure Father    Hypertension Mother          Tobacco Use    Smoking status: Never Smoker    Smokeless tobacco: Never Used   Substance and Sexual Activity    Alcohol use: Yes     Comment: seldom    Drug use: No    Sexual activity: Not Currently     Review of Systems   Unable to perform ROS: Mental status change   Objective:     Vital Signs (Most Recent):  Temp: 97.6 °F (36.4 °C) (02/22/22 1450)  Pulse: 71 (02/22/22 1703)  Resp: 20 (02/22/22 1632)  BP: (!) 147/69 (02/22/22 1703)  SpO2: 99 % (02/22/22 1703)   Vital Signs (24h Range):  Temp:  [97.6 °F  (36.4 °C)-97.8 °F (36.6 °C)] 97.6 °F (36.4 °C)  Pulse:  [67-88] 71  Resp:  [20] 20  SpO2:  [97 %-100 %] 99 %  BP: (110-147)/(63-84) 147/69     Weight: 84.8 kg (187 lb)  Body mass index is 35.33 kg/m².    Physical Exam  Constitutional:       General: She is not in acute distress.     Appearance: She is well-developed.   HENT:      Head: Normocephalic and atraumatic.   Eyes:      Extraocular Movements: EOM normal.      Pupils: Pupils are equal, round, and reactive to light.   Cardiovascular:      Rate and Rhythm: Normal rate and regular rhythm.      Heart sounds: No murmur heard.  Pulmonary:      Effort: Pulmonary effort is normal. No respiratory distress.      Breath sounds: Normal breath sounds. No wheezing or rales.   Abdominal:      General: Bowel sounds are normal. There is no distension.      Palpations: Abdomen is soft.      Tenderness: There is no abdominal tenderness.   Musculoskeletal:         General: Normal range of motion.   Skin:     General: Skin is warm and dry.      Findings: No rash.   Neurological:      Mental Status: She is alert.      Cranial Nerves: No cranial nerve deficit.      Comments: Lip smacking (brother states she does not normally do this)  Right side weaker than left which is her baseline post prior stroke.  Right pronator drift  Oriented to self and location but not year or situation   Psychiatric:         Behavior: Behavior normal.         CRANIAL NERVES     CN III, IV, VI   Pupils are equal, round, and reactive to light.  Extraocular motions are normal.      Significant Labs: All pertinent labs within the past 24 hours have been reviewed.    Significant Imaging: I have reviewed all pertinent imaging results/findings within the past 24 hours.    Assessment/Plan:     * Stroke  Place in observation for stroke workup/rule out stroke to do for  CT head reviewed  MRI/MRA brain ordered  Carotid ultrasound ordered  TTE with bubble ordered  PT/OT/SLP evaluations  Neurology evaluation  withPermissive hypertension, labetalol for SBP greater than 220/110 until MRI brain rules out stroke then can restart home blood pressure medications.  Telemetry monitoring  ASA and statin  Neuro checks        Hypothyroidism  Patient has chronic hypothyroidism. TFTs reviewed-   Lab Results   Component Value Date    TSH 1.679 02/22/2022   . Will continue chronic levothyroxine and adjust for and clinical changes.        Hyperlipidemia   Patient is chronically on statin.will continue for now. Monitor clinically. Last LDL was   Lab Results   Component Value Date    LDLCALC 62.4 (L) 10/04/2021            Essential hypertension  Hold home antihypertensives until MRI brain rules out stroke      Seizures  History of pseudoseizures per brother, determined not to be true seizures  Two episodes today.  Not on antiepileptic  Check EEG      History of ischemic left MCA stroke  Noted      Type 2 diabetes mellitus without complication, with long-term current use of insulin  Patient's FSGs are controlled on current medication regimen.  Last A1c reviewed-   Lab Results   Component Value Date    HGBA1C 6.0 (H) 02/15/2022     Most recent fingerstick glucose reviewed-   Recent Labs   Lab 02/22/22  1617   POCTGLUCOSE 92     Current correctional scale  Medium  Maintain anti-hyperglycemic dose as follows-   Antihyperglycemics (From admission, onward)            None        Hold Oral hypoglycemics while patient is in the hospital.        VTE Risk Mitigation (From admission, onward)    None             Juliann Rodrigez MD  Department of Hospital Medicine   [facility] - Emergency Dept   5

## 2023-01-19 NOTE — PRE-OP CHECKLIST - PATIENT PROBLEMS/NEEDS
Addended by: MAGGIE WYNN on: 1/19/2023 01:03 PM     Modules accepted: Orders    
Patient expressed no known problems or needs

## 2023-04-08 NOTE — PROGRESS NOTE ADULT - SUBJECTIVE AND OBJECTIVE BOX
EFRAÍN BRADOFRD 50y Male  MRN#: [id number]   CODE STATUS: FULL      SUBJECTIVE  Patient is a 50y old Male who presents with a chief complaint of Right ankle edema, pain, erythema (20 Jul 2021 13:18)  Currently admitted to medicine with the primary diagnosis of Cellulitis    Today is hospital day 2d, and this morning he is resting in bed comfortably and reports no overnight events.     Present Today:           Riojas Catheter (x)No/ ()Yes? Indication:          Central Line (x)No/ ()Yes? Indication:          IV Fluids (x)No/ ()Yes? Type:  Rate:  Indication:      OBJECTIVE  PAST MEDICAL & SURGICAL HISTORY  HTN (hypertension)    Acid reflux      Home Meds:  Benicar 20 mg oral tablet: 1 tab(s) orally once a day  NexIUM 40 mg oral delayed release capsule: 1 cap(s) orally once a day    ALLERGIES:  No Known Allergies    MEDICATIONS:  STANDING MEDICATIONS  clindamycin IVPB 600 milliGRAM(s) IV Intermittent every 8 hours  enoxaparin Injectable 40 milliGRAM(s) SubCutaneous daily  ibuprofen  Tablet. 400 milliGRAM(s) Oral every 6 hours  pantoprazole    Tablet 40 milliGRAM(s) Oral before breakfast  vancomycin  IVPB      vancomycin  IVPB 1250 milliGRAM(s) IV Intermittent every 12 hours    PRN MEDICATIONS      VITAL SIGNS: Last 24 Hours  T(C): 36.4 (21 Jul 2021 05:18), Max: 36.7 (20 Jul 2021 13:56)  T(F): 97.6 (21 Jul 2021 05:18), Max: 98.1 (20 Jul 2021 13:56)  HR: 72 (21 Jul 2021 05:18) (72 - 105)  BP: 109/67 (21 Jul 2021 05:18) (109/67 - 131/75)  BP(mean): --  RR: 20 (21 Jul 2021 05:18) (18 - 20)  SpO2: 97% (20 Jul 2021 21:35) (95% - 97%)    LABS:                        12.9   7.01  )-----------( 278      ( 21 Jul 2021 06:47 )             39.3     07-21    140  |  103  |  16  ----------------------------<  97  5.3<H>   |  29  |  0.9    Ca    9.1      21 Jul 2021 06:47  Mg     2.2     07-21    TPro  6.7  /  Alb  4.0  /  TBili  0.5  /  DBili  x   /  AST  57<H>  /  ALT  101<H>  /  AlkPhos  222<H>  07-21                  RADIOLOGY:  < from: Xray Foot AP + Lateral + Oblique, Right (07.19.21 @ 13:46) >  Impression:  No acute fracture or dislocation.    --- End of Report ---    < end of copied text >  < from: VA Duplex Lower Ext Vein Scan, Bilat (07.19.21 @ 13:17) >  Impression:    No evidence of deep venousthrombosis or superficial thrombophlebitis in the bilateral lower extremities.    ICD-10:M79.89    < end of copied text >      PHYSICAL EXAM:  General: NAD, AAOx3  HEENT: atraumatic, normocephalic, EOMI, PERRLA, conjunctiva and sclera clear, No JVD  Pulmonary: clear to auscultation bilaterally; No wheeze  Cardiovascular: Regular rate and rhythm; no murmurs, rubs or gallops. Normal S1S2  Gastrointestinal: Soft, nontender, nondistended; bowel sounds present  Musculoskeletal: 2+ peripheral pulses, no clubbing, cyanosis or edema  Neurology: non-focal, Pt. alert and oriented, fluent speech, able to move all extremities  Skin: +erythema and swelling of R. lateral and medial ankle with pain on active ROM. Pt. denies paraesthesias with passive ROM. Increasing erythema from yesterday       .

## 2023-07-13 ENCOUNTER — APPOINTMENT (OUTPATIENT)
Dept: UROLOGY | Facility: CLINIC | Age: 53
End: 2023-07-13
Payer: COMMERCIAL

## 2023-07-13 VITALS
SYSTOLIC BLOOD PRESSURE: 130 MMHG | DIASTOLIC BLOOD PRESSURE: 83 MMHG | WEIGHT: 195 LBS | HEART RATE: 73 BPM | TEMPERATURE: 97.3 F | HEIGHT: 71 IN | BODY MASS INDEX: 27.3 KG/M2

## 2023-07-13 DIAGNOSIS — N40.1 BENIGN PROSTATIC HYPERPLASIA WITH LOWER URINARY TRACT SYMPMS: ICD-10-CM

## 2023-07-13 DIAGNOSIS — Z80.3 FAMILY HISTORY OF MALIGNANT NEOPLASM OF BREAST: ICD-10-CM

## 2023-07-13 DIAGNOSIS — N13.8 BENIGN PROSTATIC HYPERPLASIA WITH LOWER URINARY TRACT SYMPMS: ICD-10-CM

## 2023-07-13 DIAGNOSIS — Z00.00 ENCOUNTER FOR GENERAL ADULT MEDICAL EXAMINATION W/OUT ABNORMAL FINDINGS: ICD-10-CM

## 2023-07-13 DIAGNOSIS — R97.20 ELEVATED PROSTATE, SPECIFIC ANTIGEN [PSA]: ICD-10-CM

## 2023-07-13 DIAGNOSIS — Z80.8 FAMILY HISTORY OF MALIGNANT NEOPLASM OF OTHER ORGANS OR SYSTEMS: ICD-10-CM

## 2023-07-13 LAB
BILIRUB UR QL STRIP: NORMAL
COLLECTION METHOD: NORMAL
GLUCOSE UR-MCNC: NORMAL
HCG UR QL: 0.2 EU/DL
HGB UR QL STRIP.AUTO: NORMAL
KETONES UR-MCNC: NORMAL
LEUKOCYTE ESTERASE UR QL STRIP: NORMAL
NITRITE UR QL STRIP: NORMAL
PH UR STRIP: 5.5
PROT UR STRIP-MCNC: NORMAL
SP GR UR STRIP: 1.02

## 2023-07-13 PROCEDURE — 81003 URINALYSIS AUTO W/O SCOPE: CPT | Mod: NC,QW

## 2023-07-13 PROCEDURE — 99203 OFFICE O/P NEW LOW 30 MIN: CPT

## 2023-07-13 RX ORDER — GABAPENTIN 300 MG
300 TABLET ORAL
Refills: 0 | Status: DISCONTINUED | COMMUNITY
End: 2023-07-13

## 2023-07-13 RX ORDER — FAMOTIDINE 10 MG/1
TABLET, FILM COATED ORAL
Refills: 0 | Status: ACTIVE | COMMUNITY

## 2023-07-13 RX ORDER — APIXABAN 5 MG/1
5 TABLET, FILM COATED ORAL TWICE DAILY
Qty: 180 | Refills: 1 | Status: DISCONTINUED | COMMUNITY
Start: 2019-12-02 | End: 2023-07-13

## 2023-07-13 RX ORDER — APIXABAN 5 MG/1
TABLET, FILM COATED ORAL
Refills: 0 | Status: DISCONTINUED | COMMUNITY
End: 2023-07-13

## 2023-07-13 RX ORDER — OLMESARTAN MEDOXOMIL-HYDROCHLOROTHIAZIDE 25; 40 MG/1; MG/1
TABLET, FILM COATED ORAL
Refills: 0 | Status: DISCONTINUED | COMMUNITY
End: 2023-07-13

## 2023-08-01 ENCOUNTER — APPOINTMENT (OUTPATIENT)
Dept: PAIN MANAGEMENT | Facility: CLINIC | Age: 53
End: 2023-08-01
Payer: COMMERCIAL

## 2023-08-01 VITALS
WEIGHT: 195 LBS | HEART RATE: 71 BPM | SYSTOLIC BLOOD PRESSURE: 152 MMHG | BODY MASS INDEX: 27.3 KG/M2 | HEIGHT: 71 IN | DIASTOLIC BLOOD PRESSURE: 83 MMHG

## 2023-08-01 PROCEDURE — 96136 PSYCL/NRPSYC TST PHY/QHP 1ST: CPT | Mod: 59

## 2023-08-01 PROCEDURE — 99214 OFFICE O/P EST MOD 30 MIN: CPT

## 2023-08-03 NOTE — PHYSICAL EXAM
[de-identified] : BACK - tenderness into the lumbar paraspinals. ROM restricted. Pain with flexion. Positive SLR bilaterally.

## 2023-08-03 NOTE — ASSESSMENT
[FreeTextEntry1] : 52 year old male presenting with severe lumbar radicular pain. Patient is presenting with radicular pain with impairment in ADLs and functionality.  The pain has not responded to conservative care, including medications, stretching, as well as active modalities, such as physical therapy.  Imaging studies as well as physical exam findings corroborate the symptomatology and radicular pain.  We will proceed with an epidural at this point. Follow up in 6 weeks will be made for reassessment. I have explained the findings to the patient and all questions have been answered.   Right L4-5 transforaminal epidural steroid injection with sedation.  Patient had a MRI that shows a radicular component along with pain referred into the lower extremity. Patient has trialed rehab (Home exercise, physical therapy or chiropractic care) and medications I will schedule a L4-5 SNRI.   Risk, benefits, pros and cons of procedure were explained to the patient using models and diagrams and their questions were answered.    The patient has severe anxiety of procedures that necessitates monitored anesthesia care (MAC). The procedure performed will be close to major nerves, arteries, and spinal cord and/or joint structures. Due to the proximity of these structures, we need the patient to be still during the procedure.  With the help of MAC, this will be safely achieved and decrease the risk of any complications.  Neuropsychological SOAPP and PCS testing was performed as an evaluation of cognition, mood, personality, behavior to assess likelihood of addiction, misuse, other aberrant medication-related behaviors, and different thoughts and feelings that may be associated with pain. The total time spent rendering and interpreting the service was approximately 20 minutes. Results will be implemented in the appropriate care of the patient  Entered by Carin Espinal, acting as scribe for Dr. Reese.   The documentation recorded by the scribe, in my presence, accurately reflects the service I personally performed, and the decisions made by me with my edits as appropriate.   Best Regards,  Randell Reese MD  Board Certified, Anesthesiology  Board Certified, Pain Medicine

## 2023-08-03 NOTE — DATA REVIEWED
[FreeTextEntry1] : MRI Lumbar Spine. I reviewed the films/CD and additionally noted: MRI of the lumbar spine taken on 11-11-19 showed lower lumbar degenerative disc/facet change. Right L4-5 cephalad extruded disc herniation with right L4 nerve root involvement and right anterolateral thecal sac effacement. Bilateral L4-5 foraminal disc extension which abuts the L4 nerve roots. Central right L5-S1 disc protrusion which abuts the right S1 nerve root. Bilateral L5-S1 foraminal narrowing which contacts the L5 nerve roots. Grade 1 L5-S1 retrolisthesis. No significant spinal canal stenosis. Probable left renal cysts.  MRI of the lumbar spine taken on 07/29/2020 showed subtle central T11-12 disc protrusion. Lower lumbar degenerative disc/facet change. Right L4-5 foraminal disc extension which abuts the right L4 nerve root. Marked L4-5 disc bulge. Central right L5-S1 disc bulge/ridge which abuts the right S1 nerve root. Bilateral L5-S1 foraminal narrowing which abuts the L5 nerve roots. No significant spinal canal stenosis. The previously seen right L4-5 cephalad extruded disc herniation has been resected. No significant change otherwise   MRI of the lumbar spine dated 7- showed disc bulge at l4-5.   SOAPP: Scored a 0 , low risk.   [place] REGISTRY: Reviewed .     UDS: taken 8-1-2023.     Medications that trigger a UDS: Benzodiazepines (Ativan, Xanax, Valium) etc, Barbiturates, Narcotics (Avinza, Butrans, hydrocodone, Codeine, Nuzhat, Methadone, Morphine, MS Contin, Opana, oxycodone, Oxycontin, Suboxone etc), Pregabalin (Lyrica), Tramadol (Ultacet, Utram etc), Tapentadol, (Nucynta) and Elist Drugs (cocaine, THC, Etc.)   Risk factors: Bipolar Illness, positive for any an illicit drugs, history of any ETOH and drug abuse, any signs of diversion, Sharing Meds, selling meds. Non consistent [place] drug reporting and above 120meq of morphine   Low risk: Patient has combination of a low risk SOAP and no risk factors. UDS would be repeated randomly every quarter

## 2023-08-03 NOTE — HISTORY OF PRESENT ILLNESS
[FreeTextEntry1] : HISTORY OF PRESENT ILLNESS: Mr. Luna is a 49-year old male complaining of lower back pain. The lower back pain radiates into the right hip and down the right extremity. The pain causes weakness into the right foot. The patient denies any recent accidents or falls to precipitate this injury. The patient has had this pain for 2 weeks. Patient describes pain as severe. During the last month the pain has been nearly constant with symptoms worsening in no typical pattern. Pain described as shooting, sharp, pressure-like, cramping, dull/aching, and throbbing. Pain is associated with numbness/pins and needles into the right lower extremity. Patient has weakness in the right lower extremity. Pain is increased with lying down. Pain is not changed with standing, sitting, walking, relaxation, and bowel movements. Bowel or bladder habits have not changed.  TODAY: Revisit encounter after a long delay.   He is presenting today after a long delay.  To recap, he has chronic history of severe lower back pain.  He recently underwent spinal surgery in December 2020.  He was then recently admitted to the hospital for severe lower back pain with radiation into the legs.  He did have an MRI as well as a CT scan of the lumbar spine.  Currently, he has 7 out of 10 lower back pain.  He has associated numbness, tingling as well as spasms.  He has significant trouble with his ADLs.

## 2023-08-07 ENCOUNTER — APPOINTMENT (OUTPATIENT)
Dept: PAIN MANAGEMENT | Facility: CLINIC | Age: 53
End: 2023-08-07
Payer: COMMERCIAL

## 2023-08-07 PROCEDURE — 00630 ANES PX LUMBAR REGION NOS: CPT | Mod: QZ,P2

## 2023-08-07 PROCEDURE — 72100 X-RAY EXAM L-S SPINE 2/3 VWS: CPT

## 2023-08-07 PROCEDURE — 93770 DETERMINATION VENOUS PRESS: CPT | Mod: 59

## 2023-08-07 PROCEDURE — 93040 RHYTHM ECG WITH REPORT: CPT | Mod: 59

## 2023-08-07 PROCEDURE — 94761 N-INVAS EAR/PLS OXIMETRY MLT: CPT

## 2023-08-07 PROCEDURE — 64483 NJX AA&/STRD TFRM EPI L/S 1: CPT | Mod: RT,59

## 2023-08-07 NOTE — PROCEDURE
[FreeTextEntry1] : SELECTIVE TRANSFORAMINAL LUMBAR L4-5 EPIDURAL NERVE ROOT INJECTION UNDER FLUOROSCOPY [FreeTextEntry3] : Date:  2023  Patient: Diego Luna  :  1970     Preoperative Diagnosis: Lumbar Radiculopathy    Procedure: 1. Selective Right L4-5 Transforaminal Lumbar Epidural Nerve Root Injection under Fluoroscopy 2. Epidurography 3. Fluoroscopic guidance and localization of needle    Physician: Randell Reese M.D. Anesthesiologist/CRNA: Ms. Lyons Anesthesia: MAC, Versed 3mg, Fentanyl 150 mcg Medical Necessity:  Failure of conservative management.    Consent:  Though unusual, the possible complications including infection, bleeding, nerve damage, hospital admission, stroke, pneumothorax, death or failure of the procedure are theoretically possible. The patient was educated about the of the procedure and alternative therapies. All questions were answered and the patient freely gave consent to proceed. Indication for Fluoroscopy:  This procedure requires the precise placement of the spinal needle into the epidural space.  It is the only way to accurately and safely perform the injection.   PROCEDURE NOTE:  After obtaining written consent, the patient was then positioned on the fluoroscopy table in the prone position with a pillow beneath the pelvis to reduce lumbar lordosis. The lumbar area was prepped with betadine solution and draped in the usual manner. A time out was performed. The fluoroscope was used to identify the L3///L4///L5 vertebral body on the AP projection. It was then rotated into an oblique projection until the superior articulating process of the L5 (inferior) vertebra is projected beneath the 6 o'clock position of the L4 (superior) vertebrae. The 22 gauge 3.5inch needle was inserted in the skin at a point overlying the superior articulating process of the inferior vertebra and aimed for the 6 o'clock position of the superior vertebrae's pedicle.  After the needle contacted bone, a lateral projection was obtained to insure that the needle tip was in proximity with the vertebral body. Paresthesias were not noted.  One ml of Omnipaque 240 was injected and a neurogram was obtained. Following demonstration of the neurogram, 1 ml of Preservative free normal saline and 1 ml of dexamethasone (10mg) was injected. The small volume and relatively high concentration was chosen to preserve selectivity and diagnostic value of the injection. There was no CSF or heme identified.    Epidurogram: The nerve root was observed in its outline on the neurogram. Distal and proximal spread was noted.  Findings: Lumbar Spine AP and oblique views with x-ray degenerative changes noted.  Complications: none.   Disposition: I have examined the patient and there are no new physical findings since original presentation. The patient was discharged home with a . The discharge instruction sheet was given to the patient. Motor function was intact.  Comment: 1st TFESI today, depending on effectiveness would schedule 2nd TFESI in 1-2 weeks vs caudal epidural steroid vs follow up in office. Call if any problems      Randell Reese MD  Board Certified, Anesthesiology  Board Certified, Pain Medicine

## 2023-08-16 ENCOUNTER — APPOINTMENT (OUTPATIENT)
Dept: PAIN MANAGEMENT | Facility: CLINIC | Age: 53
End: 2023-08-16
Payer: COMMERCIAL

## 2023-08-16 PROCEDURE — 93040 RHYTHM ECG WITH REPORT: CPT | Mod: 59

## 2023-08-16 PROCEDURE — 93770 DETERMINATION VENOUS PRESS: CPT

## 2023-08-16 PROCEDURE — 72100 X-RAY EXAM L-S SPINE 2/3 VWS: CPT

## 2023-08-16 PROCEDURE — 64483 NJX AA&/STRD TFRM EPI L/S 1: CPT | Mod: RT,59

## 2023-08-16 PROCEDURE — 00630 ANES PX LUMBAR REGION NOS: CPT | Mod: QZ,P2

## 2023-08-16 PROCEDURE — 94761 N-INVAS EAR/PLS OXIMETRY MLT: CPT

## 2023-08-16 NOTE — PROCEDURE
[FreeTextEntry1] : SELECTIVE TRANSFORAMINAL LUMBAR L4-5 EPIDURAL NERVE ROOT INJECTION UNDER FLUOROSCOPY [FreeTextEntry3] : Date:  2023  Patient: Diego Luna  :  1970     Preoperative Diagnosis: Lumbar Radiculopathy    Procedure: 1. Selective Right L4-5 Transforaminal Lumbar Epidural Nerve Root Injection under Fluoroscopy 2. Epidurography 3. Fluoroscopic guidance and localization of needle    Physician: Randell Reese M.D. Anesthesiologist/CRNA: Dr. Amaya Ms. Diego Anesthesia: MAC, Versed 4mg, Fentanyl 100 mcg Medical Necessity:  Failure of conservative management.    Consent:  Though unusual, the possible complications including infection, bleeding, nerve damage, hospital admission, stroke, pneumothorax, death or failure of the procedure are theoretically possible. The patient was educated about the of the procedure and alternative therapies. All questions were answered and the patient freely gave consent to proceed. Indication for Fluoroscopy:  This procedure requires the precise placement of the spinal needle into the epidural space.  It is the only way to accurately and safely perform the injection.   PROCEDURE NOTE:  After obtaining written consent, the patient was then positioned on the fluoroscopy table in the prone position with a pillow beneath the pelvis to reduce lumbar lordosis. The lumbar area was prepped with betadine solution and draped in the usual manner. A time out was performed. The fluoroscope was used to identify the L3///L4///L5 vertebral body on the AP projection. It was then rotated into an oblique projection until the superior articulating process of the L5 (inferior) vertebra is projected beneath the 6 o'clock position of the L4 (superior) vertebrae. The 22 gauge 3.5inch needle was inserted in the skin at a point overlying the superior articulating process of the inferior vertebra and aimed for the 6 o'clock position of the superior vertebrae's pedicle.  After the needle contacted bone, a lateral projection was obtained to insure that the needle tip was in proximity with the vertebral body. Paresthesias were not noted.  One ml of Omnipaque 240 was injected and a neurogram was obtained. Following demonstration of the neurogram, 1 ml of Preservative free normal saline and 1 ml of dexamethasone (10mg) was injected. The small volume and relatively high concentration was chosen to preserve selectivity and diagnostic value of the injection. There was no CSF or heme identified.    Epidurogram: The nerve root was observed in its outline on the neurogram. Distal and proximal spread was noted.  Findings: Lumbar Spine AP and oblique views with x-ray degenerative changes noted.  Complications: none.   Disposition: I have examined the patient and there are no new physical findings since original presentation. The patient was discharged home with a . The discharge instruction sheet was given to the patient. Motor function was intact.  Comment: 2nd TFESI today, depending on effectiveness would schedule 3rd  TFESI in 1-2 weeks vs caudal epidural steroid vs follow up in office. Call if any problems       Randell Reese MD  Board Certified, Anesthesiology  Board Certified, Pain Medicine

## 2023-08-30 ENCOUNTER — APPOINTMENT (OUTPATIENT)
Dept: PAIN MANAGEMENT | Facility: CLINIC | Age: 53
End: 2023-08-30
Payer: COMMERCIAL

## 2023-08-30 VITALS
BODY MASS INDEX: 27.3 KG/M2 | WEIGHT: 195 LBS | HEIGHT: 71 IN | SYSTOLIC BLOOD PRESSURE: 143 MMHG | DIASTOLIC BLOOD PRESSURE: 93 MMHG | HEART RATE: 88 BPM

## 2023-08-30 PROCEDURE — 99214 OFFICE O/P EST MOD 30 MIN: CPT

## 2023-08-30 NOTE — ASSESSMENT
[FreeTextEntry1] : 52-year-old male presenting with ongoing lumbar radicular pain.  He is scheduled for repeat epidural injection.  I encouraged him to continue with this treatment.  He needs paperwork filled out.  Short-term disability.  We will accommodate and filling out this paperwork.  He will follow-up in 4 weeks.  Entered by Carin Espinal, acting as scribe for Dr. Reese.   The documentation recorded by the scribe, in my presence, accurately reflects the service I personally performed, and the decisions made by me with my edits as appropriate.   Best Regards,  Randell Reese MD  Board Certified, Anesthesiology  Board Certified, Pain Medicine

## 2023-08-30 NOTE — HISTORY OF PRESENT ILLNESS
[FreeTextEntry1] : HISTORY OF PRESENT ILLNESS: Mr. Luna is a 49-year old male complaining of lower back pain. The lower back pain radiates into the right hip and down the right extremity. The pain causes weakness into the right foot. The patient denies any recent accidents or falls to precipitate this injury. The patient has had this pain for 2 weeks. Patient describes pain as severe. During the last month the pain has been nearly constant with symptoms worsening in no typical pattern. Pain described as shooting, sharp, pressure-like, cramping, dull/aching, and throbbing. Pain is associated with numbness/pins and needles into the right lower extremity. Patient has weakness in the right lower extremity. Pain is increased with lying down. Pain is not changed with standing, sitting, walking, relaxation, and bowel movements. Bowel or bladder habits have not changed.  TODAY: Since his visit, he underwent a right L4-5 transforaminal epidural steroid injection on 8/16/2023. He has approximately 50% relief.  He continues to feel pain down his right leg.  He is on a cocktail of medications.  He states the medications help and make him feel drowsy and has been stable.  He has not been working.

## 2023-08-30 NOTE — PHYSICAL EXAM
[de-identified] : BACK - tenderness into the lumbar paraspinals. ROM restricted. Pain with flexion. Positive SLR bilaterally.

## 2023-08-31 ENCOUNTER — APPOINTMENT (OUTPATIENT)
Dept: PAIN MANAGEMENT | Facility: CLINIC | Age: 53
End: 2023-08-31
Payer: COMMERCIAL

## 2023-08-31 PROCEDURE — 64483 NJX AA&/STRD TFRM EPI L/S 1: CPT | Mod: RT

## 2023-08-31 PROCEDURE — 00630 ANES PX LUMBAR REGION NOS: CPT | Mod: QZ,P2

## 2023-08-31 PROCEDURE — 93770 DETERMINATION VENOUS PRESS: CPT

## 2023-08-31 PROCEDURE — 93040 RHYTHM ECG WITH REPORT: CPT | Mod: 59

## 2023-08-31 PROCEDURE — 94761 N-INVAS EAR/PLS OXIMETRY MLT: CPT | Mod: 59

## 2023-08-31 PROCEDURE — 72100 X-RAY EXAM L-S SPINE 2/3 VWS: CPT

## 2023-08-31 NOTE — PROCEDURE
[FreeTextEntry1] : SELECTIVE TRANSFORAMINAL LUMBAR L4-5 EPIDURAL NERVE ROOT INJECTION UNDER FLUOROSCOPY   [FreeTextEntry3] : Date:  2023  Patient: Diego Luna  :  1970     Preoperative Diagnosis: Lumbar Radiculopathy    Procedure: 1. Selective Right L4-5 Transforaminal Lumbar Epidural Nerve Root Injection under Fluoroscopy 2. Epidurography 3. Fluoroscopic guidance and localization of needle    Physician: Randell Reese M.D. Anesthesiologist/CRNA:  Cor[place]  Anesthesia: MAC, Versed 4mg , Fentanyl 100 mcg Medical Necessity:  Failure of conservative management.    Consent:  Though unusual, the possible complications including infection, bleeding, nerve damage, hospital admission, stroke, pneumothorax, death or failure of the procedure are theoretically possible. The patient was educated about the of the procedure and alternative therapies. All questions were answered and the patient freely gave consent to proceed. Indication for Fluoroscopy:  This procedure requires the precise placement of the spinal needle into the epidural space.  It is the only way to accurately and safely perform the injection.   PROCEDURE NOTE:  After obtaining written consent, the patient was then positioned on the fluoroscopy table in the prone position with a pillow beneath the pelvis to reduce lumbar lordosis. The lumbar area was prepped with betadine solution and draped in the usual manner. A time out was performed. The fluoroscope was used to identify the L3///L4///L5 vertebral body on the AP projection. It was then rotated into an oblique projection until the superior articulating process of the L5 (inferior) vertebra is projected beneath the 6 o'clock position of the L4 (superior) vertebrae. The 22 gauge 3.5inch needle was inserted in the skin at a point overlying the superior articulating process of the inferior vertebra and aimed for the 6 o'clock position of the superior vertebrae's pedicle.  After the needle contacted bone, a lateral projection was obtained to insure that the needle tip was in proximity with the vertebral body. Paresthesias were not noted.  One ml of Omnipaque 240 was injected and a neurogram was obtained. Following demonstration of the neurogram, 1 ml of Preservative free normal saline and 1 ml of dexamethasone (10mg) was injected. The small volume and relatively high concentration was chosen to preserve selectivity and diagnostic value of the injection. There was no CSF or heme identified.    Epidurogram: The nerve root was observed in its outline on the neurogram. Distal and proximal spread was noted.  Findings: Lumbar Spine AP and oblique views with x-ray degenerative changes noted.  Complications: none.   Disposition: I have examined the patient and there are no new physical findings since original presentation. The patient was discharged home with a . The discharge instruction sheet was given to the patient. Motor function was intact.  Comment: 3rd TFESI today, follow up in office. Call if any problems       Randell Reese MD  Board Certified, Anesthesiology  Board Certified, Pain Medicine

## 2023-09-28 ENCOUNTER — APPOINTMENT (OUTPATIENT)
Dept: PAIN MANAGEMENT | Facility: CLINIC | Age: 53
End: 2023-09-28
Payer: COMMERCIAL

## 2023-09-28 VITALS — WEIGHT: 195 LBS | HEIGHT: 71 IN | BODY MASS INDEX: 27.3 KG/M2

## 2023-09-28 DIAGNOSIS — M96.1 POSTLAMINECTOMY SYNDROME, NOT ELSEWHERE CLASSIFIED: ICD-10-CM

## 2023-09-28 DIAGNOSIS — M51.26 OTHER INTERVERTEBRAL DISC DISPLACEMENT, LUMBAR REGION: ICD-10-CM

## 2023-09-28 DIAGNOSIS — G89.29 RADICULOPATHY, LUMBAR REGION: ICD-10-CM

## 2023-09-28 DIAGNOSIS — M54.16 RADICULOPATHY, LUMBAR REGION: ICD-10-CM

## 2023-09-28 PROCEDURE — 99213 OFFICE O/P EST LOW 20 MIN: CPT

## 2023-09-28 RX ORDER — METHOCARBAMOL 500 MG/1
500 TABLET, FILM COATED ORAL
Qty: 60 | Refills: 3 | Status: ACTIVE | COMMUNITY
Start: 2023-08-16 | End: 1900-01-01

## 2023-09-28 RX ORDER — GABAPENTIN 800 MG/1
800 TABLET, COATED ORAL 3 TIMES DAILY
Qty: 90 | Refills: 3 | Status: ACTIVE | COMMUNITY
Start: 2023-08-01 | End: 1900-01-01

## 2023-10-30 ENCOUNTER — RX RENEWAL (OUTPATIENT)
Age: 53
End: 2023-10-30

## 2023-10-30 RX ORDER — NAPROXEN 500 MG/1
500 TABLET ORAL
Qty: 60 | Refills: 0 | Status: ACTIVE | COMMUNITY
Start: 2023-08-01 | End: 1900-01-01

## 2023-11-14 ENCOUNTER — APPOINTMENT (OUTPATIENT)
Dept: UROLOGY | Facility: CLINIC | Age: 53
End: 2023-11-14

## 2023-11-28 ENCOUNTER — APPOINTMENT (OUTPATIENT)
Dept: PAIN MANAGEMENT | Facility: CLINIC | Age: 53
End: 2023-11-28

## 2023-12-14 ENCOUNTER — APPOINTMENT (OUTPATIENT)
Dept: UROLOGY | Facility: CLINIC | Age: 53
End: 2023-12-14

## 2024-07-30 NOTE — ED ADULT NURSE NOTE - NS ED NURSE RECORD ANOTHER HT AND WT
Patient was given discharge instructions and verbalized understanding. Patient denied having any questions at this time.  
No

## 2024-10-20 NOTE — H&P ADULT - NSHPPHYSICALEXAM_GEN_ALL_CORE
General: A/ox 3, No acute Distress  Neck: Supple, NO JVD  Cardiac: S1 S2 heard regular, No audible murmurs  Pulmonary: Good bilateral breath sounds, Breathing unlabored, No Rhonchi/Rales/Wheezing  Abdomen: Soft, Non -tender, +BS   Extremities: Erythema, tenderness and +2 pitting edema around right ankle and dorsum of foot. Notable pustule? overlying preston's tendon (source of entry?)  Neuro: A/o x 3, No focal deficits  Psch: normal mood , normal affect    T(C): 36.9 (07-19-21 @ 10:42), Max: 36.9 (07-19-21 @ 10:42)  HR: 86 (07-19-21 @ 10:42) (86 - 86)  BP: 132/76 (07-19-21 @ 10:42) (132/76 - 132/76)  RR: 18 (07-19-21 @ 10:42) (18 - 18)  SpO2: 98% (07-19-21 @ 10:42) (98% - 98%)
no